# Patient Record
Sex: MALE | Race: WHITE | NOT HISPANIC OR LATINO | Employment: FULL TIME | URBAN - METROPOLITAN AREA
[De-identification: names, ages, dates, MRNs, and addresses within clinical notes are randomized per-mention and may not be internally consistent; named-entity substitution may affect disease eponyms.]

---

## 2017-11-13 ENCOUNTER — OFFICE VISIT (OUTPATIENT)
Dept: URGENT CARE | Facility: MEDICAL CENTER | Age: 32
End: 2017-11-13
Payer: COMMERCIAL

## 2017-11-13 PROCEDURE — G0382 LEV 3 HOSP TYPE B ED VISIT: HCPCS

## 2017-12-30 ENCOUNTER — OFFICE VISIT (OUTPATIENT)
Dept: URGENT CARE | Facility: MEDICAL CENTER | Age: 32
End: 2017-12-30
Payer: COMMERCIAL

## 2017-12-30 PROCEDURE — 99283 EMERGENCY DEPT VISIT LOW MDM: CPT

## 2017-12-30 PROCEDURE — 90715 TDAP VACCINE 7 YRS/> IM: CPT

## 2017-12-30 PROCEDURE — G0382 LEV 3 HOSP TYPE B ED VISIT: HCPCS

## 2017-12-30 PROCEDURE — 12002 RPR S/N/AX/GEN/TRNK2.6-7.5CM: CPT

## 2018-01-01 NOTE — PROGRESS NOTES
Assessment   1  Laceration of thigh, left (890 0) (S71 112A)    Plan   Laceration of skin of knee    · Lidocaine HCl - 1 % Injection Solution   · Lidocaine HCl - 2 % Injection Solution  Laceration of thigh, left    · Tdap (Boostrix)    Discussion/Summary   Discussion Summary:    1  Keep skin clean and dry Keep wound covered Watch for S&S of infection (redness, swelling, skin drainage, fever) Suture removal in 7-10 days  Medication Side Effects Reviewed: Possible side effects of new medications were reviewed with the patient/guardian today  Understands and agrees with treatment plan: The treatment plan was reviewed with the patient/guardian  The patient/guardian understands and agrees with the treatment plan      Chief Complaint   1  Skin Wound  Chief Complaint Free Text Note Form: Pt states he was using a  to cut open a box and accidentally cut L thigh above knee approx 1 inch in length  History of Present Illness   HPI: The patient is a 27-year-old male who presents with a laceration to the anterior aspect of his left thigh  He was using a  when it slipped, striking and on the left thigh  Patient is unaware of his last tetanus vaccine  Hospital Based Practices Required Assessment:      Pain Assessment      the patient states they have pain  The pain is located in the L LEG  (on a scale of 0 to 10, the patient rates the pain at 4 )      Abuse And Domestic Violence Screen   Domestic violence screen not done today  Reason DV Screen not done: not alone       Depression And Suicide Screen  Suicide screen not done today  -- Reason suicide screen not done: not alone  Primary Language is  english  Readiness To Learn: Receptive  Barriers To Learning: none        Preferred Learning: verbal      Education Completed: disease/condition-- and-- treatment/procedure      Teaching Method: verbal      Person Taught: patient      Evaluation Of Learning: verbalized/demonstrated understanding      Review of Systems   Focused-Male:      Constitutional: no fever or chills, feels well, no tiredness, no recent weight loss or weight gain  Integumentary: skin lesion-- and-- skin wound, but-- no rashes  Active Problems   1  Acute URI (465 9) (J06 9)    Past Medical History   Active Problems And Past Medical History Reviewed: The active problems and past medical history were reviewed and updated today  Family History   Mother    1  No pertinent family history  Father    2  No pertinent family history  Family History Reviewed: The family history was reviewed and updated today  Social History    · Alcohol ingestion of one to four drinks per week (V69 8) (Z78 9)   · Never a smoker  Social History Reviewed: The social history was reviewed and updated today  Surgical History   1  Denied: History Of Prior Surgery  Surgical History Reviewed: The surgical history was reviewed and updated today  Current Meds    1  No Reported Medications Recorded  Medication List Reviewed: The medication list was reviewed and updated today  Allergies   1  No Known Drug Allergies    Vitals   Signs   Recorded: [id number] 03:23PM   Temperature: 98 7 F  Heart Rate: 103  Respiration: 16  Systolic: 097  Diastolic: 554  Height: 5 ft 11 in  Weight: 226 lb 12 8 oz  BMI Calculated: 31 63  BSA Calculated: 2 22  O2 Saturation: 98  Pain Scale: 4    Physical Exam        Constitutional      General appearance: No acute distress, well appearing and well nourished  Pulmonary      Respiratory effort: No increased work of breathing or signs of respiratory distress  Auscultation of lungs: Clear to auscultation  Cardiovascular      Auscultation of heart: Normal rate and rhythm, normal S1 and S2, without murmurs  Skin      Skin and subcutaneous tissue: Abnormal  -- 5 cm laceration noted on the left thigh  Procedure        Procedure: wound repair   The wound was located on the left thigh,-- and was 5 cm in length  The wound was simple  The wound was linear  the neurovascular exam was normal, but-- there was no sensory deficit  The site was prepped with Betadine, cleansed and irrigated extensively  Lidocaine 4 ml, 2%, without epinephrine was injected  Closure: The cutaneous layer was closed with 5 sutures of 4-0 nylon--   simple interrupted sutures were used for the skin closure  Dressing: a sterile dressing was placed  Patient Status:  the patient tolerated the procedure well   There were no complications      Signatures    Electronically signed by : Puja Baker [facility]; Dec 30 2017  7:33PM EST                       (Author)     Electronically signed by : SHAD Wang Sa ; Dec 31 2017  1:11PM EST                       (Co-author)

## 2018-01-18 NOTE — MISCELLANEOUS
Message  Return to work or school:   Scott Shine is under my professional care  He was seen in my office on 11/13/17   He is able to return to work on  11/14       excuse due to illness        Signatures   Electronically signed by : Lukas Ramos, [facility]; Nov 13 2017 10:29AM EST                       (Author)

## 2018-01-23 VITALS
SYSTOLIC BLOOD PRESSURE: 160 MMHG | TEMPERATURE: 98.7 F | DIASTOLIC BLOOD PRESSURE: 100 MMHG | BODY MASS INDEX: 31.75 KG/M2 | OXYGEN SATURATION: 98 % | HEIGHT: 71 IN | RESPIRATION RATE: 16 BRPM | WEIGHT: 226.8 LBS | HEART RATE: 103 BPM

## 2019-03-10 ENCOUNTER — OFFICE VISIT (OUTPATIENT)
Dept: URGENT CARE | Facility: MEDICAL CENTER | Age: 34
End: 2019-03-10
Payer: COMMERCIAL

## 2019-03-10 VITALS
WEIGHT: 232.2 LBS | DIASTOLIC BLOOD PRESSURE: 79 MMHG | OXYGEN SATURATION: 97 % | BODY MASS INDEX: 33.24 KG/M2 | SYSTOLIC BLOOD PRESSURE: 135 MMHG | RESPIRATION RATE: 18 BRPM | HEIGHT: 70 IN | HEART RATE: 100 BPM | TEMPERATURE: 99.5 F

## 2019-03-10 DIAGNOSIS — J02.9 SORE THROAT: ICD-10-CM

## 2019-03-10 DIAGNOSIS — J06.9 ACUTE URI: Primary | ICD-10-CM

## 2019-03-10 LAB — S PYO AG THROAT QL: NEGATIVE

## 2019-03-10 PROCEDURE — G0382 LEV 3 HOSP TYPE B ED VISIT: HCPCS | Performed by: PHYSICIAN ASSISTANT

## 2019-03-10 NOTE — PATIENT INSTRUCTIONS
Upper respiratory infection  bromfed 4 times a day as needed  proventil 2 puffs every 6 hours as needed  Follow up with PCP in 3-5 days  Proceed to  ER if symptoms worsen  Upper Respiratory Infection   WHAT YOU NEED TO KNOW:   An upper respiratory infection is also called the common cold  It is an infection that can affect your nose, throat, ears, and sinuses  For healthy people, the common cold is usually not serious and does not need special treatment  Cold symptoms are usually worst for the first 3 to 5 days  Most people get better in 7 to 14 days  You may continue to cough for 2 to 3 weeks  Colds are caused by viruses and do not get better with antibiotics  DISCHARGE INSTRUCTIONS:   Return to the emergency department if:   · You have chest pain or trouble breathing  Contact your healthcare provider if:   · You have a fever over 102ºF (39°C)  · Your sore throat gets worse or you see white or yellow spots in your throat  · Your symptoms get worse after 3 to 5 days or your cold is not better in 14 days  · You have a rash anywhere on your skin  · You have large, tender lumps in your neck  · You have thick, green or yellow drainage from your nose  · You cough up thick yellow, green, or bloody mucus  · You have vomiting for more than 24 hours and cannot keep fluids down  · You have a bad earache  · You have questions or concerns about your condition or care  Medicines: You may need any of the following:  · Decongestants  help reduce nasal congestion and help you breathe more easily  If you take decongestant pills, they may make you feel restless or cause problems with your sleep  Do not use decongestant sprays for more than a few days  · Cough suppressants  help reduce coughing  Ask your healthcare provider which type of cough medicine is best for you  · NSAIDs , such as ibuprofen, help decrease swelling, pain, and fever   NSAIDs can cause stomach bleeding or kidney problems in certain people  If you take blood thinner medicine, always ask your healthcare provider if NSAIDs are safe for you  Always read the medicine label and follow directions  · Acetaminophen  decreases pain and fever  It is available without a doctor's order  Ask how much to take and how often to take it  Follow directions  Read the labels of all other medicines you are using to see if they also contain acetaminophen, or ask your doctor or pharmacist  Acetaminophen can cause liver damage if not taken correctly  Do not use more than 4 grams (4,000 milligrams) total of acetaminophen in one day  · Take your medicine as directed  Contact your healthcare provider if you think your medicine is not helping or if you have side effects  Tell him or her if you are allergic to any medicine  Keep a list of the medicines, vitamins, and herbs you take  Include the amounts, and when and why you take them  Bring the list or the pill bottles to follow-up visits  Carry your medicine list with you in case of an emergency  Follow up with your healthcare provider as directed:  Write down your questions so you remember to ask them during your visits  Self-care:   · Rest as much as possible  Slowly start to do more each day  · Drink more liquids as directed  Liquids will help thin and loosen mucus so you can cough it up  Liquids will also help prevent dehydration  Liquids that help prevent dehydration include water, fruit juice, and broth  Do not drink liquids that contain caffeine  Caffeine can increase your risk for dehydration  Ask your healthcare provider how much liquid to drink each day  · Soothe a sore throat  Gargle with warm salt water  This helps your sore throat feel better  Make salt water by dissolving ¼ teaspoon salt in 1 cup warm water  You may also suck on hard candy or throat lozenges  You may use a sore throat spray  · Use a humidifier or vaporizer    Use a cool mist humidifier or a vaporizer to increase air moisture in your home  This may make it easier for you to breathe and help decrease your cough  · Use saline nasal drops as directed  These help relieve congestion  · Apply petroleum-based jelly around the outside of your nostrils  This can decrease irritation from blowing your nose  · Do not smoke  Nicotine and other chemicals in cigarettes and cigars can make your symptoms worse  They can also cause infections such as bronchitis or pneumonia  Ask your healthcare provider for information if you currently smoke and need help to quit  E-cigarettes or smokeless tobacco still contain nicotine  Talk to your healthcare provider before you use these products  Prevent spreading your cold to others:   · Try to stay away from other people during the first 2 to 3 days of your cold when it is more easily spread  · Do not share food or drinks  · Do not share hand towels with household members  · Wash your hands often, especially after you blow your nose  Turn away from other people and cover your mouth and nose with a tissue when you sneeze or cough  © 2017 [facility] Information is for End User's use only and may not be sold, redistributed or otherwise used for commercial purposes  All illustrations and images included in CareNotes® are the copyrighted property of [employer] M [employer]  or [employer]  The above information is an  only  It is not intended as medical advice for individual conditions or treatments  Talk to your doctor, nurse or pharmacist before following any medical regimen to see if it is safe and effective for you

## 2019-03-10 NOTE — PROGRESS NOTES
[facility]        NAME: John Fernández is a 35 y o  male  : 1985    MRN: [id number]  DATE: March 10, 2019  TIME: 12:18 PM    Assessment and Plan   Acute URI [J06 9]  1  Acute URI     2  Sore throat  POCT rapid strepA         Patient Instructions     Upper respiratory infection  bromfed 4 times a day as needed  proventil 2 puffs every 6 hours as needed  Follow up with PCP in 3-5 days  Proceed to  ER if symptoms worsen  Chief Complaint     Chief Complaint   Patient presents with    Sore Throat     x 4 days          History of Present Illness       36 y/o male presents c/o cough productive of yellow sputum, fever and sore throat x 4 days  Denies chest pain, SOB      Review of Systems   Review of Systems   Constitutional: Positive for fever  HENT: Positive for sore throat  Negative for congestion, dental problem, drooling, ear pain, postnasal drip, rhinorrhea, sinus pain, trouble swallowing and voice change  Eyes: Negative  Respiratory: Positive for cough  Negative for apnea, choking, chest tightness, shortness of breath, wheezing and stridor  Cardiovascular: Negative  Negative for chest pain  Current Medications     No current outpatient medications on file  Current Allergies     Allergies as of 03/10/2019    (No Known Allergies)            The following portions of the patient's history were reviewed and updated as appropriate: allergies, current medications, past family history, past medical history, past social history, past surgical history and problem list      History reviewed  No pertinent past medical history  History reviewed  No pertinent surgical history  Family History   Problem Relation Age of Onset    No Known Problems Mother     No Known Problems Father          Medications have been verified          Objective   /79 (BP Location: Right arm, Patient Position: Sitting, Cuff Size: Standard)   Pulse 100   Temp 99 5 °F (37 5 °C) (Temporal) Resp 18   Ht 5' 10" (1 778 m)   Wt 105 kg (232 lb 3 2 oz)   SpO2 97%   BMI 33 32 kg/m²        Physical Exam     Physical Exam   Constitutional: He appears well-developed and well-nourished  No distress  HENT:   Head: Normocephalic and atraumatic  Right Ear: Hearing, tympanic membrane, external ear and ear canal normal    Left Ear: Hearing, tympanic membrane, external ear and ear canal normal    Nose: Rhinorrhea present  Right sinus exhibits no maxillary sinus tenderness and no frontal sinus tenderness  Left sinus exhibits no maxillary sinus tenderness and no frontal sinus tenderness  Mouth/Throat: Uvula is midline and mucous membranes are normal  Posterior oropharyngeal erythema present  No oropharyngeal exudate or posterior oropharyngeal edema  Neck: Normal range of motion  Neck supple  Cardiovascular: Normal rate, regular rhythm, normal heart sounds and intact distal pulses  Pulmonary/Chest: Effort normal and breath sounds normal  No respiratory distress  He has no wheezes  He has no rales  He exhibits no tenderness  Lymphadenopathy:     He has no cervical adenopathy  Skin: He is not diaphoretic

## 2019-11-18 ENCOUNTER — OFFICE VISIT (OUTPATIENT)
Dept: URGENT CARE | Facility: MEDICAL CENTER | Age: 34
End: 2019-11-18
Payer: COMMERCIAL

## 2019-11-18 VITALS
SYSTOLIC BLOOD PRESSURE: 128 MMHG | HEART RATE: 83 BPM | HEIGHT: 70 IN | WEIGHT: 228 LBS | RESPIRATION RATE: 18 BRPM | BODY MASS INDEX: 32.64 KG/M2 | OXYGEN SATURATION: 97 % | TEMPERATURE: 97.4 F | DIASTOLIC BLOOD PRESSURE: 82 MMHG

## 2019-11-18 DIAGNOSIS — R30.0 DYSURIA: Primary | ICD-10-CM

## 2019-11-18 PROCEDURE — G0382 LEV 3 HOSP TYPE B ED VISIT: HCPCS | Performed by: PHYSICIAN ASSISTANT

## 2019-11-18 PROCEDURE — 87591 N.GONORRHOEAE DNA AMP PROB: CPT | Performed by: PHYSICIAN ASSISTANT

## 2019-11-18 PROCEDURE — 87491 CHLMYD TRACH DNA AMP PROBE: CPT | Performed by: PHYSICIAN ASSISTANT

## 2019-11-18 NOTE — PROGRESS NOTES
[facility]        NAME: Simon Us is a 35 y o  male  : 1985    MRN: [id number]  DATE: 2019  TIME: 6:26 PM    Assessment and Plan   Dysuria [R30 0]  1  Dysuria  Chlamydia/GC amplified DNA by PCR       Urine dip in office is completely negative  Will send for [facility] and call if this is positive  Recommended follow-up with PCP if this is negative and symptoms persist     Patient Instructions       May take Tylenol or Motrin for pain   follow-up with PCP if symptoms do not improve   will call if urine is positive on send out    Chief Complaint     Chief Complaint   Patient presents with    Possible UTI     Dysuria x 2 days  History of Present Illness        Patient is a 66-year-old male who presents today with complaints of dysuria since yesterday  He denies urgency or frequency  He has not had any testicle pain, penile discharge, genital sores  Patient is sexually active, no new partners  He states he is not concerned for STD  No hematuria, back pain, abdominal pain  Review of Systems   Review of Systems   Constitutional: Negative for fever  Respiratory: Negative for shortness of breath  Cardiovascular: Negative for chest pain  Gastrointestinal: Negative for abdominal pain  Genitourinary: Positive for dysuria  Negative for discharge, frequency, genital sores, penile pain, testicular pain and urgency  Musculoskeletal: Negative for back pain  Current Medications     No current outpatient medications on file  Current Allergies     Allergies as of 2019    (No Known Allergies)            The following portions of the patient's history were reviewed and updated as appropriate: allergies, current medications, past family history, past medical history, past social history, past surgical history and problem list      History reviewed  No pertinent past medical history  History reviewed  No pertinent surgical history      Family History Problem Relation Age of Onset    No Known Problems Mother     No Known Problems Father          Medications have been verified  Objective   /82   Pulse 83   Temp (!) 97 4 °F (36 3 °C) (Temporal)   Resp 18   Ht 5' 10" (1 778 m)   Wt 103 kg (228 lb)   SpO2 97%   BMI 32 71 kg/m²        Physical Exam     Physical Exam   Constitutional: He appears well-developed and well-nourished  HENT:   Head: Normocephalic and atraumatic  Cardiovascular: Normal rate and regular rhythm  Pulmonary/Chest: Effort normal and breath sounds normal    Abdominal: Soft  Bowel sounds are normal  He exhibits no distension  There is no tenderness  Skin: Skin is warm and dry

## 2019-11-18 NOTE — PATIENT INSTRUCTIONS
May take Tylenol or Motrin for pain   follow-up with PCP if symptoms do not improve   will call if urine is positive on send out    Dysuria   WHAT YOU NEED TO KNOW:   Dysuria is difficulty urinating, or pain, burning, or discomfort with urination  Dysuria is usually a symptom of another problem  DISCHARGE INSTRUCTIONS:   Return to the emergency department if:   · You have severe back, side, or abdominal pain  · You have fever and shaking chills  · You vomit several times in a row  Contact your healthcare provider if:   · Your symptoms do not go away, even after treatment  · You have questions or concerns about your condition or care  Medicines:   · Medicines  may be given to help treat a bacterial infection or help decrease bladder spasms  · Take your medicine as directed  Contact your healthcare provider if you think your medicine is not helping or if you have side effects  Tell him of her if you are allergic to any medicine  Keep a list of the medicines, vitamins, and herbs you take  Include the amounts, and when and why you take them  Bring the list or the pill bottles to follow-up visits  Carry your medicine list with you in case of an emergency  Follow up with your healthcare provider as directed: Your healthcare provider may also refer you to a urologist or nephrologist to have additional testing  Write down your questions so you remember to ask them during your visits  Manage your dysuria:   · Drink more liquids  Liquids help flush out bacteria that may be causing an infection  Ask your healthcare provider how much liquid to drink each day and which liquids are best for you  · Take sitz baths as directed  Fill a bathtub with 4 to 6 inches of warm water  You may also use a sitz bath pan that fits over a toilet  Sit in the sitz bath for 20 minutes  Do this 2 to 3 times a day, or as directed  The warm water can help decrease pain and swelling     © 2017 [facility] Information is for End User's use only and may not be sold, redistributed or otherwise used for commercial purposes  All illustrations and images included in CareNotes® are the copyrighted property of [employer] M , Inc  or [employer]  The above information is an  only  It is not intended as medical advice for individual conditions or treatments  Talk to your doctor, nurse or pharmacist before following any medical regimen to see if it is safe and effective for you

## 2019-11-19 LAB
C TRACH DNA SPEC QL NAA+PROBE: NEGATIVE
N GONORRHOEA DNA SPEC QL NAA+PROBE: NEGATIVE

## 2020-07-17 ENCOUNTER — OFFICE VISIT (OUTPATIENT)
Dept: FAMILY MEDICINE CLINIC | Facility: CLINIC | Age: 35
End: 2020-07-17
Payer: COMMERCIAL

## 2020-07-17 VITALS
WEIGHT: 221 LBS | TEMPERATURE: 98.2 F | HEART RATE: 76 BPM | SYSTOLIC BLOOD PRESSURE: 124 MMHG | DIASTOLIC BLOOD PRESSURE: 80 MMHG | OXYGEN SATURATION: 98 % | BODY MASS INDEX: 31.64 KG/M2 | HEIGHT: 70 IN | RESPIRATION RATE: 18 BRPM

## 2020-07-17 DIAGNOSIS — M54.41 ACUTE BILATERAL LOW BACK PAIN WITH RIGHT-SIDED SCIATICA: ICD-10-CM

## 2020-07-17 DIAGNOSIS — D22.9 NEVUS: ICD-10-CM

## 2020-07-17 DIAGNOSIS — Z00.00 PHYSICAL EXAM: Primary | ICD-10-CM

## 2020-07-17 LAB
SL AMB  POCT GLUCOSE, UA: NORMAL
SL AMB LEUKOCYTE ESTERASE,UA: NORMAL
SL AMB POCT BILIRUBIN,UA: NORMAL
SL AMB POCT BLOOD,UA: NORMAL
SL AMB POCT CLARITY,UA: CLEAR
SL AMB POCT COLOR,UA: YELLOW
SL AMB POCT KETONES,UA: NORMAL
SL AMB POCT NITRITE,UA: NORMAL
SL AMB POCT PH,UA: 8
SL AMB POCT SPECIFIC GRAVITY,UA: 1
SL AMB POCT URINE PROTEIN: NORMAL
SL AMB POCT UROBILINOGEN: 1

## 2020-07-17 PROCEDURE — 3008F BODY MASS INDEX DOCD: CPT | Performed by: FAMILY MEDICINE

## 2020-07-17 PROCEDURE — 99385 PREV VISIT NEW AGE 18-39: CPT | Performed by: FAMILY MEDICINE

## 2020-07-17 PROCEDURE — 81002 URINALYSIS NONAUTO W/O SCOPE: CPT | Performed by: FAMILY MEDICINE

## 2020-07-17 RX ORDER — CYCLOBENZAPRINE HCL 5 MG
5 TABLET ORAL 3 TIMES DAILY PRN
Qty: 21 TABLET | Refills: 0 | Status: SHIPPED | OUTPATIENT
Start: 2020-07-17

## 2020-07-17 RX ORDER — METHYLPREDNISOLONE 4 MG/1
TABLET ORAL
Qty: 21 EACH | Refills: 0 | Status: SHIPPED | OUTPATIENT
Start: 2020-07-17

## 2020-07-17 NOTE — PROGRESS NOTES
Assessment/Plan:  Physical exam unremarkable  Routine labs given, will fu when results are available  Some lumbago no gate change  Offered conservative measures  Dosing and all possible se of the rx medications were discussed and all questions were answered  Pt verbalized agreement and understanding of the plan of care as outlined during his visit  No problem-specific Assessment & Plan notes found for this encounter  Problem List Items Addressed This Visit     None      Visit Diagnoses     Physical exam    -  Primary    Relevant Orders    POCT urine dip (Completed)    CBC and differential    Comprehensive metabolic panel    Lipid panel    TSH, 3rd generation    UA w Reflex to Microscopic w Reflex to Culture    Ambulatory referral to Dermatology    Nevus        Acute bilateral low back pain with right-sided sciatica        Relevant Medications    methylPREDNISolone 4 MG tablet therapy pack    cyclobenzaprine (FLEXERIL) 5 mg tablet            Subjective:      Patient ID: Angelo Blair is a 29 y o  male  Pt here for a health physical  Some lumbago otherwise healthly  Dose not recall last labs if ever  The following portions of the patient's history were reviewed and updated as appropriate: current medications, past family history, past medical history, past social history and past surgical history  Review of Systems   Constitutional: Negative for appetite change and fever  HENT: Negative for congestion and trouble swallowing  Respiratory: Negative for shortness of breath  Cardiovascular: Negative for chest pain  Gastrointestinal: Negative for abdominal pain  Genitourinary: Negative for difficulty urinating  Musculoskeletal: Negative for myalgias  Neurological: Negative for dizziness  Psychiatric/Behavioral: The patient is not nervous/anxious  Objective:  BMI Counseling: Body mass index is 32 17 kg/m²   The BMI is above normal  Nutrition recommendations include decreasing portion sizes, encouraging healthy choices of fruits and vegetables, decreasing fast food intake, consuming healthier snacks, limiting drinks that contain sugar, moderation in carbohydrate intake, increasing intake of lean protein, reducing intake of saturated and trans fat and reducing intake of cholesterol  Exercise recommendations include exercising 3-5 times per week, obtaining a gym membership and strength training exercises  No pharmacotherapy was ordered  Patient referred to PCP due to patient being overweight  BMI Counseling: Body mass index is 32 17 kg/m²  Follow-up plan was not completed due to patient refusing BMI follow-up plan  /80 (BP Location: Left arm, Patient Position: Sitting, Cuff Size: Large)   Pulse 76   Temp 98 2 °F (36 8 °C) (Temporal)   Resp 18   Ht 5' 9 5" (1 765 m)   Wt 100 kg (221 lb)   SpO2 98%   BMI 32 17 kg/m²          Physical Exam   Constitutional: He is oriented to person, place, and time  He appears well-developed and well-nourished  No distress  HENT:   Head: Normocephalic  Right Ear: External ear normal    Left Ear: External ear normal    Mouth/Throat: Oropharynx is clear and moist    Eyes: Pupils are equal, round, and reactive to light  EOM are normal    Neck: Normal range of motion  Cardiovascular: Normal rate and regular rhythm  Pulmonary/Chest: Effort normal and breath sounds normal    Abdominal: Soft  Musculoskeletal: Normal range of motion  Neurological: He is alert and oriented to person, place, and time  Skin: Skin is warm and dry  Psychiatric: He has a normal mood and affect  His behavior is normal  Judgment and thought content normal    Nursing note and vitals reviewed

## 2020-07-24 ENCOUNTER — APPOINTMENT (OUTPATIENT)
Dept: LAB | Facility: MEDICAL CENTER | Age: 35
End: 2020-07-24
Payer: COMMERCIAL

## 2020-07-24 DIAGNOSIS — Z00.00 PHYSICAL EXAM: ICD-10-CM

## 2020-07-24 LAB
ALBUMIN SERPL BCP-MCNC: 4.2 G/DL (ref 3.5–5)
ALP SERPL-CCNC: 97 U/L (ref 46–116)
ALT SERPL W P-5'-P-CCNC: 29 U/L (ref 12–78)
ANION GAP SERPL CALCULATED.3IONS-SCNC: 5 MMOL/L (ref 4–13)
AST SERPL W P-5'-P-CCNC: 11 U/L (ref 5–45)
BASOPHILS # BLD AUTO: 0.05 THOUSANDS/ΜL (ref 0–0.1)
BASOPHILS NFR BLD AUTO: 1 % (ref 0–1)
BILIRUB SERPL-MCNC: 0.8 MG/DL (ref 0.2–1)
BILIRUB UR QL STRIP: NEGATIVE
BUN SERPL-MCNC: 18 MG/DL (ref 5–25)
CALCIUM SERPL-MCNC: 8.9 MG/DL (ref 8.3–10.1)
CHLORIDE SERPL-SCNC: 105 MMOL/L (ref 100–108)
CHOLEST SERPL-MCNC: 181 MG/DL (ref 50–200)
CLARITY UR: CLEAR
CO2 SERPL-SCNC: 30 MMOL/L (ref 21–32)
COLOR UR: YELLOW
CREAT SERPL-MCNC: 0.9 MG/DL (ref 0.6–1.3)
EOSINOPHIL # BLD AUTO: 0.05 THOUSAND/ΜL (ref 0–0.61)
EOSINOPHIL NFR BLD AUTO: 1 % (ref 0–6)
ERYTHROCYTE [DISTWIDTH] IN BLOOD BY AUTOMATED COUNT: 12.6 % (ref 11.6–15.1)
GFR SERPL CREATININE-BSD FRML MDRD: 111 ML/MIN/1.73SQ M
GLUCOSE P FAST SERPL-MCNC: 79 MG/DL (ref 65–99)
GLUCOSE UR STRIP-MCNC: NEGATIVE MG/DL
HCT VFR BLD AUTO: 47.7 % (ref 36.5–49.3)
HDLC SERPL-MCNC: 36 MG/DL
HGB BLD-MCNC: 16.6 G/DL (ref 12–17)
HGB UR QL STRIP.AUTO: NEGATIVE
IMM GRANULOCYTES # BLD AUTO: 0.04 THOUSAND/UL (ref 0–0.2)
IMM GRANULOCYTES NFR BLD AUTO: 1 % (ref 0–2)
KETONES UR STRIP-MCNC: NEGATIVE MG/DL
LDLC SERPL CALC-MCNC: 105 MG/DL (ref 0–100)
LEUKOCYTE ESTERASE UR QL STRIP: NEGATIVE
LYMPHOCYTES # BLD AUTO: 2.05 THOUSANDS/ΜL (ref 0.6–4.47)
LYMPHOCYTES NFR BLD AUTO: 34 % (ref 14–44)
MCH RBC QN AUTO: 29.4 PG (ref 26.8–34.3)
MCHC RBC AUTO-ENTMCNC: 34.8 G/DL (ref 31.4–37.4)
MCV RBC AUTO: 84 FL (ref 82–98)
MONOCYTES # BLD AUTO: 0.54 THOUSAND/ΜL (ref 0.17–1.22)
MONOCYTES NFR BLD AUTO: 9 % (ref 4–12)
NEUTROPHILS # BLD AUTO: 3.33 THOUSANDS/ΜL (ref 1.85–7.62)
NEUTS SEG NFR BLD AUTO: 54 % (ref 43–75)
NITRITE UR QL STRIP: NEGATIVE
NONHDLC SERPL-MCNC: 145 MG/DL
NRBC BLD AUTO-RTO: 0 /100 WBCS
PH UR STRIP.AUTO: 5.5 [PH]
PLATELET # BLD AUTO: 221 THOUSANDS/UL (ref 149–390)
PMV BLD AUTO: 11.8 FL (ref 8.9–12.7)
POTASSIUM SERPL-SCNC: 4.3 MMOL/L (ref 3.5–5.3)
PROT SERPL-MCNC: 8.1 G/DL (ref 6.4–8.2)
PROT UR STRIP-MCNC: NEGATIVE MG/DL
RBC # BLD AUTO: 5.65 MILLION/UL (ref 3.88–5.62)
SODIUM SERPL-SCNC: 140 MMOL/L (ref 136–145)
SP GR UR STRIP.AUTO: 1.03 (ref 1–1.03)
TRIGL SERPL-MCNC: 198 MG/DL
TSH SERPL DL<=0.05 MIU/L-ACNC: 2.26 UIU/ML (ref 0.36–3.74)
UROBILINOGEN UR QL STRIP.AUTO: 0.2 E.U./DL
WBC # BLD AUTO: 6.06 THOUSAND/UL (ref 4.31–10.16)

## 2020-07-24 PROCEDURE — 80053 COMPREHEN METABOLIC PANEL: CPT

## 2020-07-24 PROCEDURE — 85025 COMPLETE CBC W/AUTO DIFF WBC: CPT

## 2020-07-24 PROCEDURE — 36415 COLL VENOUS BLD VENIPUNCTURE: CPT

## 2020-07-24 PROCEDURE — 84443 ASSAY THYROID STIM HORMONE: CPT

## 2020-07-24 PROCEDURE — 81003 URINALYSIS AUTO W/O SCOPE: CPT | Performed by: NURSE PRACTITIONER

## 2020-07-24 PROCEDURE — 80061 LIPID PANEL: CPT

## 2020-09-29 ENCOUNTER — TELEPHONE (OUTPATIENT)
Dept: FAMILY MEDICINE CLINIC | Facility: CLINIC | Age: 35
End: 2020-09-29

## 2020-09-29 ENCOUNTER — TELEMEDICINE (OUTPATIENT)
Dept: FAMILY MEDICINE CLINIC | Facility: CLINIC | Age: 35
End: 2020-09-29
Payer: COMMERCIAL

## 2020-09-29 DIAGNOSIS — R05.9 COUGH: Primary | ICD-10-CM

## 2020-09-29 PROCEDURE — 99441 PR PHYS/QHP TELEPHONE EVALUATION 5-10 MIN: CPT | Performed by: NURSE PRACTITIONER

## 2020-09-29 NOTE — PROGRESS NOTES
Virtual Brief Visit    Assessment/Plan:    Problem List Items Addressed This Visit     None                Reason for visit is No chief complaint on file  Encounter provider ANNAMARIE Castañeda    Provider located at [address] [place], [place] [id number] [phone]    Recent Visits  No visits were found meeting these conditions  Showing recent visits within past 7 days and meeting all other requirements     Future Appointments  No visits were found meeting these conditions  Showing future appointments within next 150 days and meeting all other requirements        After connecting through telephone, the patient was identified by name and date of birth  Sarah Mustafa was informed that this is a telemedicine visit and that the visit is being conducted through telephone  My office door was closed  No one else was in the room  He acknowledged consent and understanding of privacy and security of the platform  The patient has agreed to participate and understands he can discontinue the visit at any time  Patient is aware this is a billable service  Subjective    Sarah Mustafa is a 29 y o  male     Pt has body aches sore thraot and cough  He needs to be tested for COIVD to return to work  No past medical history on file  Past Surgical History:   Procedure Laterality Date    TONSILLECTOMY         Current Outpatient Medications   Medication Sig Dispense Refill    cyclobenzaprine (FLEXERIL) 5 mg tablet Take 1 tablet (5 mg total) by mouth 3 (three) times a day as needed for muscle spasms 21 tablet 0    methylPREDNISolone 4 MG tablet therapy pack Use as directed on package 21 each 0     No current facility-administered medications for this visit  No Known Allergies    Review of Systems   Constitutional: Positive for fatigue  HENT: Positive for congestion and postnasal drip  Respiratory: Positive for cough and wheezing  There were no vitals filed for this visit  I spent 10 minutes directly with the patient during this visit    VIRTUAL VISIT DISCLAIMER    Woody Weeks acknowledges that he has consented to an online visit or consultation  He understands that the online visit is based solely on information provided by him, and that, in the absence of a face-to-face physical evaluation by the physician, the diagnosis he receives is both limited and provisional in terms of accuracy and completeness  This is not intended to replace a full medical face-to-face evaluation by the physician  Woody Weeks understands and accepts these terms

## 2020-09-29 NOTE — TELEPHONE ENCOUNTER
Pt called and stated that he had a virtual visit with you today and needs a work note stating that he is under our care and getting tested before cleared to go back to work  He would like to have it faxed to himself at [phone]

## 2020-09-30 DIAGNOSIS — R05.9 COUGH: ICD-10-CM

## 2020-09-30 PROCEDURE — U0003 INFECTIOUS AGENT DETECTION BY NUCLEIC ACID (DNA OR RNA); SEVERE ACUTE RESPIRATORY SYNDROME CORONAVIRUS 2 (SARS-COV-2) (CORONAVIRUS DISEASE [COVID-19]), AMPLIFIED PROBE TECHNIQUE, MAKING USE OF HIGH THROUGHPUT TECHNOLOGIES AS DESCRIBED BY CMS-2020-01-R: HCPCS | Performed by: NURSE PRACTITIONER

## 2020-10-01 LAB — SARS-COV-2 RNA SPEC QL NAA+PROBE: NOT DETECTED

## 2020-11-28 ENCOUNTER — HOSPITAL ENCOUNTER (EMERGENCY)
Facility: HOSPITAL | Age: 35
Discharge: HOME | End: 2020-11-28
Attending: EMERGENCY MEDICINE | Admitting: EMERGENCY MEDICINE
Payer: COMMERCIAL

## 2020-11-28 ENCOUNTER — AMB VIDEO VISIT (OUTPATIENT)
Dept: OTHER | Facility: HOSPITAL | Age: 35
End: 2020-11-28

## 2020-11-28 VITALS
TEMPERATURE: 99.2 F | HEART RATE: 99 BPM | RESPIRATION RATE: 18 BRPM | OXYGEN SATURATION: 97 % | DIASTOLIC BLOOD PRESSURE: 99 MMHG | WEIGHT: 217.15 LBS | BODY MASS INDEX: 31.61 KG/M2 | SYSTOLIC BLOOD PRESSURE: 146 MMHG

## 2020-11-28 DIAGNOSIS — J40 BRONCHITIS: Primary | ICD-10-CM

## 2020-11-28 PROCEDURE — 99283 EMERGENCY DEPT VISIT LOW MDM: CPT

## 2020-11-28 PROCEDURE — EVISIT: Performed by: FAMILY MEDICINE

## 2020-11-28 PROCEDURE — U0003 INFECTIOUS AGENT DETECTION BY NUCLEIC ACID (DNA OR RNA); SEVERE ACUTE RESPIRATORY SYNDROME CORONAVIRUS 2 (SARS-COV-2) (CORONAVIRUS DISEASE [COVID-19]), AMPLIFIED PROBE TECHNIQUE, MAKING USE OF HIGH THROUGHPUT TECHNOLOGIES AS DESCRIBED BY CMS-2020-01-R: HCPCS | Performed by: EMERGENCY MEDICINE

## 2020-11-28 PROCEDURE — 99284 EMERGENCY DEPT VISIT MOD MDM: CPT | Performed by: EMERGENCY MEDICINE

## 2020-11-28 RX ORDER — AZITHROMYCIN 250 MG/1
250 TABLET, FILM COATED ORAL DAILY
Qty: 4 TABLET | Refills: 0 | Status: SHIPPED | OUTPATIENT
Start: 2020-11-28 | End: 2020-12-02

## 2020-11-28 RX ORDER — AZITHROMYCIN 500 MG/1
500 TABLET, FILM COATED ORAL ONCE
Status: COMPLETED | OUTPATIENT
Start: 2020-11-28 | End: 2020-11-28

## 2020-11-28 RX ADMIN — AZITHROMYCIN 500 MG: 500 TABLET, FILM COATED ORAL at 17:08

## 2020-11-30 LAB — SARS-COV-2 RNA SPEC QL NAA+PROBE: NOT DETECTED

## 2021-04-12 DIAGNOSIS — Z23 ENCOUNTER FOR IMMUNIZATION: ICD-10-CM

## 2021-04-28 ENCOUNTER — IMMUNIZATIONS (OUTPATIENT)
Dept: FAMILY MEDICINE CLINIC | Facility: HOSPITAL | Age: 36
End: 2021-04-28

## 2021-04-28 DIAGNOSIS — Z23 ENCOUNTER FOR IMMUNIZATION: Primary | ICD-10-CM

## 2021-04-28 PROCEDURE — 91300 SARS-COV-2 / COVID-19 MRNA VACCINE (PFIZER-BIONTECH) 30 MCG: CPT

## 2021-04-28 PROCEDURE — 0001A SARS-COV-2 / COVID-19 MRNA VACCINE (PFIZER-BIONTECH) 30 MCG: CPT

## 2021-05-19 ENCOUNTER — IMMUNIZATIONS (OUTPATIENT)
Dept: FAMILY MEDICINE CLINIC | Facility: HOSPITAL | Age: 36
End: 2021-05-19

## 2021-05-19 DIAGNOSIS — Z23 ENCOUNTER FOR IMMUNIZATION: Primary | ICD-10-CM

## 2021-05-19 PROCEDURE — 0002A SARS-COV-2 / COVID-19 MRNA VACCINE (PFIZER-BIONTECH) 30 MCG: CPT

## 2021-05-19 PROCEDURE — 91300 SARS-COV-2 / COVID-19 MRNA VACCINE (PFIZER-BIONTECH) 30 MCG: CPT

## 2021-11-21 ENCOUNTER — HOSPITAL ENCOUNTER (EMERGENCY)
Facility: HOSPITAL | Age: 36
Discharge: HOME | End: 2021-11-21
Attending: EMERGENCY MEDICINE
Payer: OTHER MISCELLANEOUS

## 2021-11-21 VITALS
WEIGHT: 220 LBS | HEIGHT: 71 IN | TEMPERATURE: 98 F | BODY MASS INDEX: 30.8 KG/M2 | OXYGEN SATURATION: 97 % | RESPIRATION RATE: 18 BRPM | DIASTOLIC BLOOD PRESSURE: 92 MMHG | SYSTOLIC BLOOD PRESSURE: 150 MMHG | HEART RATE: 83 BPM

## 2021-11-21 DIAGNOSIS — H60.532 ACUTE CONTACT OTITIS EXTERNA OF LEFT EAR: Primary | ICD-10-CM

## 2021-11-21 DIAGNOSIS — H72.92 PERFORATED TYMPANIC MEMBRANE ON EXAMINATION, LEFT: ICD-10-CM

## 2021-11-21 PROCEDURE — 99282 EMERGENCY DEPT VISIT SF MDM: CPT

## 2021-11-21 PROCEDURE — 99284 EMERGENCY DEPT VISIT MOD MDM: CPT | Performed by: EMERGENCY MEDICINE

## 2021-11-21 RX ORDER — OFLOXACIN 3 MG/ML
10 SOLUTION AURICULAR (OTIC) 2 TIMES DAILY
Qty: 10 ML | Refills: 0 | Status: SHIPPED | OUTPATIENT
Start: 2021-11-21

## 2021-12-15 ENCOUNTER — TELEMEDICINE (OUTPATIENT)
Dept: FAMILY MEDICINE CLINIC | Facility: CLINIC | Age: 36
End: 2021-12-15
Payer: COMMERCIAL

## 2021-12-15 DIAGNOSIS — R53.83 FATIGUE, UNSPECIFIED TYPE: Primary | ICD-10-CM

## 2021-12-15 PROCEDURE — G2012 BRIEF CHECK IN BY MD/QHP: HCPCS | Performed by: FAMILY MEDICINE

## 2021-12-15 PROCEDURE — 87636 SARSCOV2 & INF A&B AMP PRB: CPT | Performed by: FAMILY MEDICINE

## 2021-12-17 LAB
FLUAV RNA RESP QL NAA+PROBE: NEGATIVE
FLUBV RNA RESP QL NAA+PROBE: NEGATIVE
SARS-COV-2 RNA RESP QL NAA+PROBE: NEGATIVE

## 2022-06-09 ENCOUNTER — OFFICE VISIT (OUTPATIENT)
Dept: URGENT CARE | Facility: CLINIC | Age: 37
End: 2022-06-09
Payer: COMMERCIAL

## 2022-06-09 VITALS
SYSTOLIC BLOOD PRESSURE: 150 MMHG | TEMPERATURE: 97.8 F | OXYGEN SATURATION: 97 % | DIASTOLIC BLOOD PRESSURE: 84 MMHG | HEART RATE: 88 BPM

## 2022-06-09 DIAGNOSIS — B34.9 VIRAL INFECTION: Primary | ICD-10-CM

## 2022-06-09 LAB
SARS-COV-2 AG UPPER RESP QL IA: NEGATIVE
VALID CONTROL: NORMAL

## 2022-06-09 PROCEDURE — 87811 SARS-COV-2 COVID19 W/OPTIC: CPT | Performed by: PHYSICIAN ASSISTANT

## 2022-06-09 PROCEDURE — G0382 LEV 3 HOSP TYPE B ED VISIT: HCPCS | Performed by: PHYSICIAN ASSISTANT

## 2022-06-09 NOTE — PROGRESS NOTES
[facility]        NAME: Vita Garcia is a 39 y o  male  : 1985    MRN: [id number]  DATE: 2022  TIME: 3:08 PM    Assessment and Plan   Viral infection [B34 9]  1  Viral infection  Poct Covid 19 Rapid Antigen Test         Patient Instructions   Patient Instructions   Rapid COVID negative        Follow up with PCP in 3-5 days  Proceed to  ER if symptoms worsen  Chief Complaint     Chief Complaint   Patient presents with    Cold Like Symptoms     Tues sinus h/a and runny nose with cough  Feel out of breath  At home covid test Wed  + for chills  Denies ear pain and or sore thorat         History of Present Illness       Patient presents with cough, congestion, runny nose, shortness of breath with coughing fits starting 2 days ago  Took an at home COVID test that was negative  Denies ear pain, sore throat, chest pain, shortness of breath arrest, sick contacts, fevers  Review of Systems   Review of Systems   Constitutional: Positive for chills  Negative for fatigue and fever  HENT: Negative for congestion, ear discharge, ear pain, postnasal drip, rhinorrhea, sinus pressure, sinus pain and sore throat  Respiratory: Positive for cough and shortness of breath  Negative for chest tightness and wheezing  Cardiovascular: Negative for chest pain and palpitations  Musculoskeletal: Negative for arthralgias and myalgias  Neurological: Positive for headaches  Negative for weakness  Psychiatric/Behavioral: Negative for confusion           Current Medications       Current Outpatient Medications:     cyclobenzaprine (FLEXERIL) 5 mg tablet, Take 1 tablet (5 mg total) by mouth 3 (three) times a day as needed for muscle spasms (Patient not taking: Reported on 2021 ), Disp: 21 tablet, Rfl: 0    methylPREDNISolone 4 MG tablet therapy pack, Use as directed on package (Patient not taking: Reported on 2022 ), Disp: 21 each, Rfl: 0    ofloxacin (FLOXIN) 0 3 % otic solution, Administer 10 drops into the left ear 2 (two) times a day (Patient not taking: Reported on 12/21/2021 ), Disp: 10 mL, Rfl: 0    Current Allergies     Allergies as of 06/09/2022    (No Known Allergies)            The following portions of the patient's history were reviewed and updated as appropriate: allergies, current medications, past family history, past medical history, past social history, past surgical history and problem list      Past Medical History:   Diagnosis Date    Ear problems     HL (hearing loss)        Past Surgical History:   Procedure Laterality Date    ADENOIDECTOMY      TONSILLECTOMY         Family History   Problem Relation Age of Onset    No Known Problems Mother     No Known Problems Father          Medications have been verified  Objective   /84   Pulse 88   Temp 97 8 °F (36 6 °C)   SpO2 97%        Physical Exam     Physical Exam  Constitutional:       General: He is not in acute distress  Appearance: Normal appearance  He is not ill-appearing or diaphoretic  HENT:      Right Ear: Tympanic membrane, ear canal and external ear normal       Left Ear: Tympanic membrane, ear canal and external ear normal       Nose: Nose normal       Mouth/Throat:      Mouth: Mucous membranes are moist       Pharynx: Oropharynx is clear  Eyes:      Conjunctiva/sclera: Conjunctivae normal    Cardiovascular:      Rate and Rhythm: Normal rate and regular rhythm  Heart sounds: Normal heart sounds  Pulmonary:      Effort: Pulmonary effort is normal       Breath sounds: Normal breath sounds  Skin:     General: Skin is warm and dry  Neurological:      Mental Status: He is alert     Psychiatric:         Mood and Affect: Mood normal          Behavior: Behavior normal

## 2022-07-28 ENCOUNTER — APPOINTMENT (EMERGENCY)
Dept: CT IMAGING | Facility: HOSPITAL | Age: 37
End: 2022-07-28
Payer: COMMERCIAL

## 2022-07-28 ENCOUNTER — HOSPITAL ENCOUNTER (EMERGENCY)
Facility: HOSPITAL | Age: 37
Discharge: HOME | End: 2022-07-28
Attending: EMERGENCY MEDICINE | Admitting: EMERGENCY MEDICINE
Payer: COMMERCIAL

## 2022-07-28 VITALS
BODY MASS INDEX: 30.8 KG/M2 | RESPIRATION RATE: 16 BRPM | HEIGHT: 71 IN | HEART RATE: 66 BPM | DIASTOLIC BLOOD PRESSURE: 55 MMHG | TEMPERATURE: 98.6 F | OXYGEN SATURATION: 97 % | SYSTOLIC BLOOD PRESSURE: 117 MMHG | WEIGHT: 220 LBS

## 2022-07-28 DIAGNOSIS — R10.9 ABDOMINAL PAIN: Primary | ICD-10-CM

## 2022-07-28 LAB
ALBUMIN SERPL BCP-MCNC: 4.1 G/DL (ref 3.5–5)
ALP SERPL-CCNC: 98 U/L (ref 46–116)
ALT SERPL W P-5'-P-CCNC: 28 U/L (ref 12–78)
ANION GAP SERPL CALCULATED.3IONS-SCNC: 6 MMOL/L (ref 4–13)
AST SERPL W P-5'-P-CCNC: 19 U/L (ref 5–45)
BASOPHILS # BLD AUTO: 0.03 THOUSANDS/ΜL (ref 0–0.1)
BASOPHILS NFR BLD AUTO: 1 % (ref 0–1)
BILIRUB SERPL-MCNC: 0.6 MG/DL (ref 0.2–1)
BUN SERPL-MCNC: 22 MG/DL (ref 5–25)
CALCIUM SERPL-MCNC: 8.8 MG/DL (ref 8.3–10.1)
CHLORIDE SERPL-SCNC: 101 MMOL/L (ref 96–108)
CO2 SERPL-SCNC: 29 MMOL/L (ref 21–32)
CREAT SERPL-MCNC: 1.01 MG/DL (ref 0.6–1.3)
EOSINOPHIL # BLD AUTO: 0.13 THOUSAND/ΜL (ref 0–0.61)
EOSINOPHIL NFR BLD AUTO: 2 % (ref 0–6)
ERYTHROCYTE [DISTWIDTH] IN BLOOD BY AUTOMATED COUNT: 12.4 % (ref 11.6–15.1)
GFR SERPL CREATININE-BSD FRML MDRD: 95 ML/MIN/1.73SQ M
GLUCOSE SERPL-MCNC: 93 MG/DL (ref 65–140)
HCT VFR BLD AUTO: 41.4 % (ref 36.5–49.3)
HGB BLD-MCNC: 14.5 G/DL (ref 12–17)
IMM GRANULOCYTES # BLD AUTO: 0.02 THOUSAND/UL (ref 0–0.2)
IMM GRANULOCYTES NFR BLD AUTO: 0 % (ref 0–2)
LIPASE SERPL-CCNC: 91 U/L (ref 73–393)
LYMPHOCYTES # BLD AUTO: 2.35 THOUSANDS/ΜL (ref 0.6–4.47)
LYMPHOCYTES NFR BLD AUTO: 41 % (ref 14–44)
MCH RBC QN AUTO: 29.4 PG (ref 26.8–34.3)
MCHC RBC AUTO-ENTMCNC: 35 G/DL (ref 31.4–37.4)
MCV RBC AUTO: 84 FL (ref 82–98)
MONOCYTES # BLD AUTO: 0.62 THOUSAND/ΜL (ref 0.17–1.22)
MONOCYTES NFR BLD AUTO: 11 % (ref 4–12)
NEUTROPHILS # BLD AUTO: 2.58 THOUSANDS/ΜL (ref 1.85–7.62)
NEUTS SEG NFR BLD AUTO: 45 % (ref 43–75)
NRBC BLD AUTO-RTO: 0 /100 WBCS
PLATELET # BLD AUTO: 213 THOUSANDS/UL (ref 149–390)
PMV BLD AUTO: 10.7 FL (ref 8.9–12.7)
POTASSIUM SERPL-SCNC: 3.7 MMOL/L (ref 3.5–5.3)
PROT SERPL-MCNC: 7.8 G/DL (ref 6.4–8.4)
RBC # BLD AUTO: 4.93 MILLION/UL (ref 3.88–5.62)
SODIUM SERPL-SCNC: 136 MMOL/L (ref 135–147)
WBC # BLD AUTO: 5.73 THOUSAND/UL (ref 4.31–10.16)

## 2022-07-28 PROCEDURE — 83690 ASSAY OF LIPASE: CPT | Performed by: EMERGENCY MEDICINE

## 2022-07-28 PROCEDURE — 96361 HYDRATE IV INFUSION ADD-ON: CPT

## 2022-07-28 PROCEDURE — 80053 COMPREHEN METABOLIC PANEL: CPT | Performed by: EMERGENCY MEDICINE

## 2022-07-28 PROCEDURE — 74176 CT ABD & PELVIS W/O CONTRAST: CPT

## 2022-07-28 PROCEDURE — 99284 EMERGENCY DEPT VISIT MOD MDM: CPT

## 2022-07-28 PROCEDURE — 85025 COMPLETE CBC W/AUTO DIFF WBC: CPT | Performed by: EMERGENCY MEDICINE

## 2022-07-28 PROCEDURE — 96374 THER/PROPH/DIAG INJ IV PUSH: CPT

## 2022-07-28 PROCEDURE — 99284 EMERGENCY DEPT VISIT MOD MDM: CPT | Performed by: EMERGENCY MEDICINE

## 2022-07-28 PROCEDURE — 36415 COLL VENOUS BLD VENIPUNCTURE: CPT | Performed by: EMERGENCY MEDICINE

## 2022-07-28 PROCEDURE — 96375 TX/PRO/DX INJ NEW DRUG ADDON: CPT

## 2022-07-28 RX ORDER — FAMOTIDINE 20 MG/1
20 TABLET, FILM COATED ORAL 2 TIMES DAILY
Qty: 30 TABLET | Refills: 0 | Status: SHIPPED | OUTPATIENT
Start: 2022-07-28

## 2022-07-28 RX ORDER — FAMOTIDINE 20 MG/1
20 TABLET, FILM COATED ORAL ONCE
Status: COMPLETED | OUTPATIENT
Start: 2022-07-28 | End: 2022-07-28

## 2022-07-28 RX ORDER — SUCRALFATE 1 G/1
1 TABLET ORAL ONCE
Status: DISCONTINUED | OUTPATIENT
Start: 2022-07-28 | End: 2022-07-29 | Stop reason: HOSPADM

## 2022-07-28 RX ORDER — ONDANSETRON 2 MG/ML
4 INJECTION INTRAMUSCULAR; INTRAVENOUS ONCE
Status: COMPLETED | OUTPATIENT
Start: 2022-07-28 | End: 2022-07-28

## 2022-07-28 RX ORDER — SUCRALFATE ORAL 1 G/10ML
1 SUSPENSION ORAL 4 TIMES DAILY
Qty: 414 ML | Refills: 0 | Status: SHIPPED | OUTPATIENT
Start: 2022-07-28

## 2022-07-28 RX ORDER — KETOROLAC TROMETHAMINE 30 MG/ML
15 INJECTION, SOLUTION INTRAMUSCULAR; INTRAVENOUS ONCE
Status: COMPLETED | OUTPATIENT
Start: 2022-07-28 | End: 2022-07-28

## 2022-07-28 RX ORDER — DICYCLOMINE HCL 20 MG
20 TABLET ORAL ONCE
Status: COMPLETED | OUTPATIENT
Start: 2022-07-28 | End: 2022-07-28

## 2022-07-28 RX ADMIN — FAMOTIDINE 20 MG: 20 TABLET ORAL at 23:14

## 2022-07-28 RX ADMIN — KETOROLAC TROMETHAMINE 15 MG: 30 INJECTION, SOLUTION INTRAMUSCULAR at 22:24

## 2022-07-28 RX ADMIN — DICYCLOMINE HYDROCHLORIDE 20 MG: 20 TABLET ORAL at 23:14

## 2022-07-28 RX ADMIN — SODIUM CHLORIDE 1000 ML: 0.9 INJECTION, SOLUTION INTRAVENOUS at 22:24

## 2022-07-28 RX ADMIN — ONDANSETRON 4 MG: 2 INJECTION INTRAMUSCULAR; INTRAVENOUS at 22:24

## 2022-07-29 NOTE — ED PROVIDER NOTES
History  Chief Complaint   Patient presents with    Abdominal Pain     Sharp pain left abdomen getting worse tonight  Reports blood in stool  Was doing yard work Saturday and thinks he may have injured himself then  HPI  40 yo M presents with LUQ/LLQ abdominal pain that started six days ago  He was doing physical work and is unsure if that was related  Pain is sharp, constant and moderate  Noticed blood on tissue after wiping, denies rectal pain  Occasional nausea, denies fevers, diarrhea, constipation  Prior to Admission Medications   Prescriptions Last Dose Informant Patient Reported? Taking? cyclobenzaprine (FLEXERIL) 5 mg tablet   No No   Sig: Take 1 tablet (5 mg total) by mouth 3 (three) times a day as needed for muscle spasms   Patient not taking: Reported on 11/22/2021    methylPREDNISolone 4 MG tablet therapy pack   No No   Sig: Use as directed on package   Patient not taking: Reported on 2/24/2022    ofloxacin (FLOXIN) 0 3 % otic solution   No No   Sig: Administer 10 drops into the left ear 2 (two) times a day   Patient not taking: Reported on 12/21/2021       Facility-Administered Medications: None       Past Medical History:   Diagnosis Date    Ear problems     HL (hearing loss)        Past Surgical History:   Procedure Laterality Date    ADENOIDECTOMY      TONSILLECTOMY         Family History   Problem Relation Age of Onset    No Known Problems Mother     No Known Problems Father      I have reviewed and agree with the history as documented  E-Cigarette/Vaping    E-Cigarette Use Never User      E-Cigarette/Vaping Substances     Social History     Tobacco Use    Smoking status: Never Smoker    Smokeless tobacco: Never Used   Vaping Use    Vaping Use: Never used   Substance Use Topics    Alcohol use: Yes     Comment: occasional     Drug use: Never       Review of Systems   Constitutional: Negative for chills and fever  HENT: Negative for dental problem and ear pain      Eyes: Negative for pain and redness  Respiratory: Negative for cough and shortness of breath  Cardiovascular: Negative for chest pain and palpitations  Gastrointestinal: Positive for abdominal pain and blood in stool  Negative for nausea  Endocrine: Negative for polydipsia and polyphagia  Genitourinary: Negative for dysuria and frequency  Musculoskeletal: Negative for arthralgias and joint swelling  Skin: Negative for color change and rash  Neurological: Negative for dizziness and headaches  Psychiatric/Behavioral: Negative for behavioral problems and confusion  All other systems reviewed and are negative  Physical Exam  Physical Exam  Vitals and nursing note reviewed  Constitutional:       General: He is not in acute distress  Appearance: He is well-developed  He is not diaphoretic  HENT:      Head: Atraumatic  Right Ear: External ear normal       Left Ear: External ear normal       Nose: Nose normal    Eyes:      Conjunctiva/sclera: Conjunctivae normal       Pupils: Pupils are equal, round, and reactive to light  Neck:      Vascular: No JVD  Cardiovascular:      Rate and Rhythm: Normal rate and regular rhythm  Heart sounds: Normal heart sounds  No murmur heard  Pulmonary:      Effort: Pulmonary effort is normal  No respiratory distress  Breath sounds: Normal breath sounds  No wheezing  Abdominal:      General: Bowel sounds are normal  There is no distension  Palpations: Abdomen is soft  Tenderness: There is abdominal tenderness in the left upper quadrant and left lower quadrant  Musculoskeletal:         General: Normal range of motion  Cervical back: Normal range of motion and neck supple  Skin:     General: Skin is warm and dry  Capillary Refill: Capillary refill takes less than 2 seconds  Neurological:      Mental Status: He is alert and oriented to person, place, and time  Cranial Nerves: No cranial nerve deficit     Psychiatric: Behavior: Behavior normal          Vital Signs  ED Triage Vitals   Temperature Pulse Respirations Blood Pressure SpO2   07/28/22 2231 07/28/22 2140 07/28/22 2140 07/28/22 2140 07/28/22 2140   98 6 °F (37 °C) 74 16 129/73 99 %      Temp Source Heart Rate Source Patient Position - Orthostatic VS BP Location FiO2 (%)   07/28/22 2231 -- 07/28/22 2231 07/28/22 2231 --   Oral  Sitting Left arm       Pain Score       07/28/22 2224       7           Vitals:    07/28/22 2140 07/28/22 2231 07/28/22 2300   BP: 129/73 122/62 117/55   Pulse: 74 63 66   Patient Position - Orthostatic VS:  Sitting          Visual Acuity      ED Medications  Medications   sucralfate (CARAFATE) tablet 1 g (has no administration in time range)   sodium chloride 0 9 % bolus 1,000 mL (0 mL Intravenous Stopped 7/28/22 2339)   ketorolac (TORADOL) injection 15 mg (15 mg Intravenous Given 7/28/22 2224)   dicyclomine (BENTYL) tablet 20 mg (20 mg Oral Given 7/28/22 2314)   ondansetron (ZOFRAN) injection 4 mg (4 mg Intravenous Given 7/28/22 2224)   famotidine (PEPCID) tablet 20 mg (20 mg Oral Given 7/28/22 2314)       Diagnostic Studies  Results Reviewed     Procedure Component Value Units Date/Time    Comprehensive metabolic panel [[id number]] Collected: 07/28/22 2226    Lab Status: Final result Specimen: Blood from Arm, Right Updated: 07/28/22 2253     Sodium 136 mmol/L      Potassium 3 7 mmol/L      Chloride 101 mmol/L      CO2 29 mmol/L      ANION GAP 6 mmol/L      BUN 22 mg/dL      Creatinine 1 01 mg/dL      Glucose 93 mg/dL      Calcium 8 8 mg/dL      AST 19 U/L      ALT 28 U/L      Alkaline Phosphatase 98 U/L      Total Protein 7 8 g/dL      Albumin 4 1 g/dL      Total Bilirubin 0 60 mg/dL      eGFR 95 ml/min/1 73sq m     Narrative:      [place] guidelines for Chronic Kidney Disease (CKD):     Stage 1 with normal or high GFR (GFR > 90 mL/min/1 73 square meters)    Stage 2 Mild CKD (GFR = 60-89 mL/min/1 73 square meters)    Stage 3A Moderate CKD (GFR = 45-59 mL/min/1 73 square meters)    Stage 3B Moderate CKD (GFR = 30-44 mL/min/1 73 square meters)    Stage 4 Severe CKD (GFR = 15-29 mL/min/1 73 square meters)    Stage 5 End Stage CKD (GFR <15 mL/min/1 73 square meters)  Note: GFR calculation is accurate only with a steady state creatinine    Lipase [[id number]]  (Normal) Collected: 07/28/22 2226    Lab Status: Final result Specimen: Blood from Arm, Right Updated: 07/28/22 2253     Lipase 91 u/L     CBC and differential [[id number]] Collected: 07/28/22 2226    Lab Status: Final result Specimen: Blood from Arm, Right Updated: 07/28/22 2231     WBC 5 73 Thousand/uL      RBC 4 93 Million/uL      Hemoglobin 14 5 g/dL      Hematocrit 41 4 %      MCV 84 fL      MCH 29 4 pg      MCHC 35 0 g/dL      RDW 12 4 %      MPV 10 7 fL      Platelets 408 Thousands/uL      nRBC 0 /100 WBCs      Neutrophils Relative 45 %      Immat GRANS % 0 %      Lymphocytes Relative 41 %      Monocytes Relative 11 %      Eosinophils Relative 2 %      Basophils Relative 1 %      Neutrophils Absolute 2 58 Thousands/µL      Immature Grans Absolute 0 02 Thousand/uL      Lymphocytes Absolute 2 35 Thousands/µL      Monocytes Absolute 0 62 Thousand/µL      Eosinophils Absolute 0 13 Thousand/µL      Basophils Absolute 0 03 Thousands/µL                  CT abdomen pelvis wo contrast   Final Result by Selam Patel MD (07/28 2315)      No acute CT findings  Workstation performed: [id number]                    Procedures  Procedures         ED Course                                             MDM  Patient presents with abdominal pain  CT negative, labs are unremarkable and reassuring  Patient appears well  Discussed Pepcid, Carafate for possible gastritis, discussed GI follow-up    Disposition  Final diagnoses:   Abdominal pain     Time reflects when diagnosis was documented in both MDM as applicable and the Disposition within this note     Time User Action Codes Description Comment    7/28/2022 11:20 PM Mckay Bernal Add [R10 9] Abdominal pain       ED Disposition     ED Disposition   Discharge    Condition   Stable    Date/Time   Thu Jul 28, 2022 11:22 PM    Comment   John Fernández discharge to home/self care  Follow-up Information     Follow up With Specialties Details Why Contact Info Additional [facility] Gastroenterology Schedule an appointment as soon as possible for a visit   [address] [phone]  Clarisa Ameya Shannon [address] [facility], [address] [facility], [address], [facility], [address] [facility], [address] [facility]           Discharge Medication List as of 7/28/2022 11:25 PM      START taking these medications    Details   famotidine (PEPCID) 20 mg tablet Take 1 tablet (20 mg total) by mouth 2 (two) times a day, Starting Thu 7/28/2022, Normal      sucralfate (CARAFATE) 1 g/10 mL suspension Take 10 mL (1 g total) by mouth 4 (four) times a day, Starting Thu 7/28/2022, Normal         CONTINUE these medications which have NOT CHANGED    Details   cyclobenzaprine (FLEXERIL) 5 mg tablet Take 1 tablet (5 mg total) by mouth 3 (three) times a day as needed for muscle spasms, Starting Fri 7/17/2020, Normal      methylPREDNISolone 4 MG tablet therapy pack Use as directed on package, Normal      ofloxacin (FLOXIN) 0 3 % otic solution Administer 10 drops into the left ear 2 (two) times a day, Starting Sun 11/21/2021, Print             No discharge procedures on file      PDMP Review     None          ED Provider  Electronically Signed by           Handy Lawson MD  07/29/22 5897

## 2022-09-01 ENCOUNTER — OFFICE VISIT (OUTPATIENT)
Dept: URGENT CARE | Facility: MEDICAL CENTER | Age: 37
End: 2022-09-01
Payer: COMMERCIAL

## 2022-09-01 VITALS
OXYGEN SATURATION: 99 % | BODY MASS INDEX: 30.8 KG/M2 | WEIGHT: 220 LBS | DIASTOLIC BLOOD PRESSURE: 82 MMHG | RESPIRATION RATE: 18 BRPM | SYSTOLIC BLOOD PRESSURE: 150 MMHG | TEMPERATURE: 98 F | HEIGHT: 71 IN | HEART RATE: 80 BPM

## 2022-09-01 DIAGNOSIS — B02.9 HERPES ZOSTER WITHOUT COMPLICATION: Primary | ICD-10-CM

## 2022-09-01 PROCEDURE — G0382 LEV 3 HOSP TYPE B ED VISIT: HCPCS

## 2022-09-01 RX ORDER — VALACYCLOVIR HYDROCHLORIDE 1 G/1
1000 TABLET, FILM COATED ORAL 3 TIMES DAILY
Qty: 21 TABLET | Refills: 0 | Status: SHIPPED | OUTPATIENT
Start: 2022-09-01 | End: 2022-09-08

## 2022-09-01 NOTE — PROGRESS NOTES
[facility]        NAME: Omayra Bowman is a 39 y o  male  : 1985    MRN: [id number]  DATE: 2022  TIME: 9:04 AM      Assessment and Plan     Herpes zoster without complication [V58 2]  1  Herpes zoster without complication  valACYclovir (VALTREX) 1,000 mg tablet     Will treat with valtrex given less than 72 hours of symptoms, no eye involvement, and patient is not immunocompromised  Patient Instructions     Take the antiviral as directed  Can use calamine lotion on the area to help with pain and itching  Acetaminophen or ibuprofen for pain  PCP follow-up in 3-5 days  Proceed to the ER if symptoms worsen  Chief Complaint     Chief Complaint   Patient presents with    Herpes Zoster     Painful vesicular rash on right ribcage that started on Tuesday          History of Present Illness     Patient is a 78-year-old male who presents with rash on right side of abdomen for two days- 48 hours  States the pain is painful (burning) and itchy  States that at first he thought it was poison until it worsened  Denies fever or chills  Denies n/v/d  Denies DM history  States his son was just sick with hand,foot, and mouth  Review of Systems     Review of Systems   Constitutional: Negative for chills and fever  HENT: Negative for congestion, rhinorrhea and sore throat  Respiratory: Negative for cough  Gastrointestinal: Negative for diarrhea, nausea and vomiting  Skin: Positive for rash  All other systems reviewed and are negative          Current Medications       Current Outpatient Medications:     valACYclovir (VALTREX) 1,000 mg tablet, Take 1 tablet (1,000 mg total) by mouth 3 (three) times a day for 7 days, Disp: 21 tablet, Rfl: 0    cyclobenzaprine (FLEXERIL) 5 mg tablet, Take 1 tablet (5 mg total) by mouth 3 (three) times a day as needed for muscle spasms (Patient not taking: Reported on 2021 ), Disp: 21 tablet, Rfl: 0    famotidine (PEPCID) 20 mg tablet, Take 1 tablet (20 mg total) by mouth 2 (two) times a day, Disp: 30 tablet, Rfl: 0    methylPREDNISolone 4 MG tablet therapy pack, Use as directed on package (Patient not taking: Reported on 2/24/2022 ), Disp: 21 each, Rfl: 0    ofloxacin (FLOXIN) 0 3 % otic solution, Administer 10 drops into the left ear 2 (two) times a day (Patient not taking: Reported on 12/21/2021 ), Disp: 10 mL, Rfl: 0    sucralfate (CARAFATE) 1 g/10 mL suspension, Take 10 mL (1 g total) by mouth 4 (four) times a day, Disp: 414 mL, Rfl: 0    Current Allergies     Allergies as of 09/01/2022    (No Known Allergies)              The following portions of the patient's history were reviewed and updated as appropriate: allergies, current medications, past family history, past medical history, past social history, past surgical history and problem list      Past Medical History:   Diagnosis Date    Ear problems     HL (hearing loss)        Past Surgical History:   Procedure Laterality Date    ADENOIDECTOMY      TONSILLECTOMY         Family History   Problem Relation Age of Onset    No Known Problems Mother     No Known Problems Father          Medications have been verified  Objective     /82   Pulse 80   Temp 98 °F (36 7 °C)   Resp 18   Ht 5' 11" (1 803 m)   Wt 99 8 kg (220 lb)   SpO2 99%   BMI 30 68 kg/m²   No LMP for male patient  Physical Exam     Physical Exam  Vitals and nursing note reviewed  Constitutional:       General: He is not in acute distress  Appearance: Normal appearance  He is not ill-appearing, toxic-appearing or diaphoretic  HENT:      Right Ear: Tympanic membrane, ear canal and external ear normal       Left Ear: Tympanic membrane, ear canal and external ear normal       Nose: Nose normal       Mouth/Throat:      Lips: Pink  Mouth: Mucous membranes are moist       Tongue: No lesions  Palate: No lesions  Pharynx: Oropharynx is clear  Uvula midline     Eyes: General: Lids are normal       Extraocular Movements: Extraocular movements intact  Conjunctiva/sclera: Conjunctivae normal       Pupils: Pupils are equal, round, and reactive to light  Cardiovascular:      Rate and Rhythm: Normal rate  Pulses: Normal pulses  Heart sounds: Normal heart sounds, S1 normal and S2 normal    Pulmonary:      Effort: Pulmonary effort is normal       Breath sounds: Normal breath sounds and air entry  No stridor, decreased air movement or transmitted upper airway sounds  No decreased breath sounds, wheezing, rhonchi or rales  Skin:     General: Skin is warm  Capillary Refill: Capillary refill takes less than 2 seconds  Findings: Rash present  Rash is vesicular  Neurological:      General: No focal deficit present  Mental Status: He is alert  Psychiatric:         Mood and Affect: Mood normal          Behavior: Behavior normal          Thought Content:  Thought content normal          Judgment: Judgment normal

## 2022-09-01 NOTE — PATIENT INSTRUCTIONS
Take the antiviral as directed  Can use calamine lotion on the area to help with pain and itching  Acetaminophen or ibuprofen for pain  PCP follow-up in 3-5 days  Proceed to the ER if symptoms worsen

## 2022-09-02 ENCOUNTER — OFFICE VISIT (OUTPATIENT)
Dept: FAMILY MEDICINE CLINIC | Facility: CLINIC | Age: 37
End: 2022-09-02
Payer: COMMERCIAL

## 2022-09-02 VITALS
WEIGHT: 224.5 LBS | SYSTOLIC BLOOD PRESSURE: 126 MMHG | DIASTOLIC BLOOD PRESSURE: 76 MMHG | OXYGEN SATURATION: 98 % | TEMPERATURE: 97.9 F | RESPIRATION RATE: 18 BRPM | HEIGHT: 71 IN | BODY MASS INDEX: 31.43 KG/M2 | HEART RATE: 79 BPM

## 2022-09-02 DIAGNOSIS — B02.9 HERPES ZOSTER WITHOUT COMPLICATION: Primary | ICD-10-CM

## 2022-09-02 PROCEDURE — 3725F SCREEN DEPRESSION PERFORMED: CPT | Performed by: NURSE PRACTITIONER

## 2022-09-02 PROCEDURE — 99213 OFFICE O/P EST LOW 20 MIN: CPT | Performed by: NURSE PRACTITIONER

## 2022-09-02 RX ORDER — GABAPENTIN 100 MG/1
100 CAPSULE ORAL 3 TIMES DAILY
Qty: 60 CAPSULE | Refills: 0 | Status: SHIPPED | OUTPATIENT
Start: 2022-09-02

## 2022-09-02 NOTE — PROGRESS NOTES
Assessment/Plan:  Shingles and post herpetic neuralgia  Physical assessment was unremarkable or the exception of the shingles rash which in is in a paced state of resolution  On patient is having some post herpetic neuralgia did advised will send gabapentin 100 mg to the pharmacy I will advised that he start with a p m  Dose if he fails to tolerate he is to contact me back in the office otherwise use as needed  Dosing all possible side effects of the prescribed medications or medications that had been prescribed in the past were reviewed and all questions were answered  Patient verbalized agreement and understanding of the plan of care as outlined during the office visit today return to office as indicated or sooner if a problem arises  Problem List Items Addressed This Visit    None     Visit Diagnoses     Herpes zoster without complication    -  Primary    Relevant Medications    gabapentin (Neurontin) 100 mg capsule            Subjective:      Patient ID: Evonne Encarnacion is a 39 y o  male  Patient is here to be seen for a painful on right-sided rash was diagnosed with shingles on the Valtrex 3 g per day for 7 days his been the on the medication for 2 days tolerating it very well he is just concerned about the discomfort he denies any other related symptoms  The following portions of the patient's history were reviewed and updated as appropriate: allergies, current medications, past family history, past medical history, past social history, past surgical history and problem list     Review of Systems   Constitutional: Negative for appetite change and fever  HENT: Negative for congestion and trouble swallowing  Respiratory: Negative for shortness of breath  Cardiovascular: Negative for chest pain  Gastrointestinal: Negative for abdominal pain  Genitourinary: Negative for difficulty urinating  Musculoskeletal: Negative for myalgias     Skin: Positive for rash (Known shingles rash right side of body thorax)  Neurological: Negative for dizziness  Psychiatric/Behavioral: The patient is not nervous/anxious  Objective:      /76 (BP Location: Left arm, Patient Position: Sitting, Cuff Size: Large)   Pulse 79   Temp 97 9 °F (36 6 °C)   Resp 18   Ht 5' 11" (1 803 m)   Wt 102 kg (224 lb 8 oz)   SpO2 98%   BMI 31 31 kg/m²          Physical Exam  Cardiovascular:      Rate and Rhythm: Normal rate and regular rhythm  Heart sounds: Normal heart sounds  Pulmonary:      Effort: Pulmonary effort is normal       Breath sounds: Normal breath sounds  Skin:     Findings: Rash (Is also form rash right side of the thorax wrapping around posterior to anterior  No sign of infection  State of resolution ) present  Neurological:      Mental Status: He is alert

## 2022-12-26 ENCOUNTER — HOSPITAL ENCOUNTER (EMERGENCY)
Facility: HOSPITAL | Age: 37
Discharge: HOME | End: 2022-12-26
Attending: EMERGENCY MEDICINE

## 2022-12-26 ENCOUNTER — APPOINTMENT (EMERGENCY)
Dept: RADIOLOGY | Facility: HOSPITAL | Age: 37
End: 2022-12-26

## 2022-12-26 VITALS
SYSTOLIC BLOOD PRESSURE: 154 MMHG | HEIGHT: 70 IN | DIASTOLIC BLOOD PRESSURE: 97 MMHG | HEART RATE: 77 BPM | BODY MASS INDEX: 31.5 KG/M2 | OXYGEN SATURATION: 99 % | RESPIRATION RATE: 17 BRPM | TEMPERATURE: 98.3 F | WEIGHT: 220 LBS

## 2022-12-26 DIAGNOSIS — R07.9 CHEST PAIN: Primary | ICD-10-CM

## 2022-12-26 LAB
ALBUMIN SERPL BCP-MCNC: 4.1 G/DL (ref 3.5–5)
ALP SERPL-CCNC: 122 U/L (ref 46–116)
ALT SERPL W P-5'-P-CCNC: 27 U/L (ref 12–78)
ANION GAP SERPL CALCULATED.3IONS-SCNC: 9 MMOL/L (ref 4–13)
AST SERPL W P-5'-P-CCNC: 29 U/L (ref 5–45)
ATRIAL RATE: 77 BPM
BASOPHILS # BLD AUTO: 0.05 THOUSANDS/ÂΜL (ref 0–0.1)
BASOPHILS NFR BLD AUTO: 1 % (ref 0–1)
BILIRUB SERPL-MCNC: 0.5 MG/DL (ref 0.2–1)
BUN SERPL-MCNC: 16 MG/DL (ref 5–25)
CALCIUM SERPL-MCNC: 8.7 MG/DL (ref 8.3–10.1)
CARDIAC TROPONIN I PNL SERPL HS: 2 NG/L
CHLORIDE SERPL-SCNC: 103 MMOL/L (ref 96–108)
CO2 SERPL-SCNC: 26 MMOL/L (ref 21–32)
CREAT SERPL-MCNC: 0.84 MG/DL (ref 0.6–1.3)
EOSINOPHIL # BLD AUTO: 0.13 THOUSAND/ÂΜL (ref 0–0.61)
EOSINOPHIL NFR BLD AUTO: 2 % (ref 0–6)
ERYTHROCYTE [DISTWIDTH] IN BLOOD BY AUTOMATED COUNT: 12.2 % (ref 11.6–15.1)
GFR SERPL CREATININE-BSD FRML MDRD: 111 ML/MIN/1.73SQ M
GLUCOSE SERPL-MCNC: 101 MG/DL (ref 65–140)
HCT VFR BLD AUTO: 43.7 % (ref 36.5–49.3)
HGB BLD-MCNC: 15.3 G/DL (ref 12–17)
IMM GRANULOCYTES # BLD AUTO: 0.02 THOUSAND/UL (ref 0–0.2)
IMM GRANULOCYTES NFR BLD AUTO: 0 % (ref 0–2)
LYMPHOCYTES # BLD AUTO: 1.82 THOUSANDS/ÂΜL (ref 0.6–4.47)
LYMPHOCYTES NFR BLD AUTO: 31 % (ref 14–44)
MCH RBC QN AUTO: 29.4 PG (ref 26.8–34.3)
MCHC RBC AUTO-ENTMCNC: 35 G/DL (ref 31.4–37.4)
MCV RBC AUTO: 84 FL (ref 82–98)
MONOCYTES # BLD AUTO: 0.55 THOUSAND/ÂΜL (ref 0.17–1.22)
MONOCYTES NFR BLD AUTO: 9 % (ref 4–12)
NEUTROPHILS # BLD AUTO: 3.4 THOUSANDS/ÂΜL (ref 1.85–7.62)
NEUTS SEG NFR BLD AUTO: 57 % (ref 43–75)
NRBC BLD AUTO-RTO: 0 /100 WBCS
P AXIS: 70 DEGREES
PLATELET # BLD AUTO: 219 THOUSANDS/UL (ref 149–390)
PMV BLD AUTO: 11 FL (ref 8.9–12.7)
POTASSIUM SERPL-SCNC: 4.1 MMOL/L (ref 3.5–5.3)
PR INTERVAL: 188 MS
PROT SERPL-MCNC: 8.1 G/DL (ref 6.4–8.4)
QRS AXIS: 80 DEGREES
QRSD INTERVAL: 100 MS
QT INTERVAL: 372 MS
QTC INTERVAL: 420 MS
RBC # BLD AUTO: 5.2 MILLION/UL (ref 3.88–5.62)
SODIUM SERPL-SCNC: 138 MMOL/L (ref 135–147)
T WAVE AXIS: 42 DEGREES
VENTRICULAR RATE: 77 BPM
WBC # BLD AUTO: 5.97 THOUSAND/UL (ref 4.31–10.16)

## 2022-12-26 NOTE — ED PROVIDER NOTES
History  Chief Complaint   Patient presents with   • Chest Pain     Patient co chest pain and bilateral shoulder pain that started a few days ago  Patient describes pain as "cramping "      44yo male with no significant past medical history presenting for evaluation of chest pain x 2 days  Pain is left-sided and is described as cramping  Pain is mild to moderate in intensity  Pain was initially intermittent but has been constant since yesterday  He also reports right shoulder discomfort  He denies any trauma  He denies any provoking or alleviating factors  Specifically, he denies any pleuritic or exertional pain  He is otherwise asymptomatic and denies shortness of breath, dizziness, syncope, diaphoresis, nausea, vomiting, abdominal pain  No personal or family history of cardiac disease  History provided by:  Patient   used: No    Chest Pain  Pain location:  L chest  Pain quality comment:  Cramping  Pain radiates to:  Does not radiate  Pain radiates to the back: no    Pain severity:  Moderate  Onset quality:  Gradual  Duration:  2 days  Timing:  Constant  Progression:  Unchanged  Chronicity:  New  Relieved by:  Nothing  Worsened by:  Nothing tried  Associated symptoms: no abdominal pain, no cough, no diaphoresis, no dizziness, no fever, no lower extremity edema, no nausea, no numbness, no shortness of breath, no syncope, not vomiting and no weakness        Prior to Admission Medications   Prescriptions Last Dose Informant Patient Reported? Taking?    cyclobenzaprine (FLEXERIL) 5 mg tablet   No No   Sig: Take 1 tablet (5 mg total) by mouth 3 (three) times a day as needed for muscle spasms   Patient not taking: Reported on 11/22/2021    famotidine (PEPCID) 20 mg tablet   No No   Sig: Take 1 tablet (20 mg total) by mouth 2 (two) times a day   gabapentin (Neurontin) 100 mg capsule   No No   Sig: Take 1 capsule (100 mg total) by mouth 3 (three) times a day   methylPREDNISolone 4 MG tablet therapy pack   No No   Sig: Use as directed on package   Patient not taking: Reported on 2/24/2022    ofloxacin (FLOXIN) 0 3 % otic solution   No No   Sig: Administer 10 drops into the left ear 2 (two) times a day   Patient not taking: Reported on 12/21/2021    sucralfate (CARAFATE) 1 g/10 mL suspension   No No   Sig: Take 10 mL (1 g total) by mouth 4 (four) times a day   Patient not taking: Reported on 9/2/2022   valACYclovir (VALTREX) 1,000 mg tablet   No No   Sig: Take 1 tablet (1,000 mg total) by mouth 3 (three) times a day for 7 days      Facility-Administered Medications: None       Past Medical History:   Diagnosis Date   • Ear problems    • HL (hearing loss)        Past Surgical History:   Procedure Laterality Date   • ADENOIDECTOMY     • TONSILLECTOMY         Family History   Problem Relation Age of Onset   • No Known Problems Mother    • No Known Problems Father      I have reviewed and agree with the history as documented  E-Cigarette/Vaping   • E-Cigarette Use Never User      E-Cigarette/Vaping Substances   • Nicotine No    • THC No    • CBD No    • Flavoring No    • Other No    • Unknown No      Social History     Tobacco Use   • Smoking status: Never   • Smokeless tobacco: Never   Vaping Use   • Vaping Use: Never used   Substance Use Topics   • Alcohol use: Yes     Comment: occasional    • Drug use: Never       Review of Systems   Constitutional: Negative for chills, diaphoresis and fever  HENT: Negative for drooling and voice change  Eyes: Negative for discharge and redness  Respiratory: Negative for cough, shortness of breath and stridor  Cardiovascular: Positive for chest pain  Negative for leg swelling and syncope  Gastrointestinal: Negative for abdominal pain, nausea and vomiting  Musculoskeletal: Negative for neck pain and neck stiffness  Skin: Negative for color change and rash  Neurological: Negative for dizziness, seizures, syncope, weakness and numbness  Psychiatric/Behavioral: Negative for confusion  The patient is not nervous/anxious  All other systems reviewed and are negative  Physical Exam  Physical Exam  Vitals and nursing note reviewed  Constitutional:       General: He is not in acute distress  Appearance: He is well-developed  He is not diaphoretic  HENT:      Head: Normocephalic and atraumatic  Right Ear: External ear normal       Left Ear: External ear normal    Eyes:      General: No scleral icterus  Right eye: No discharge  Left eye: No discharge  Conjunctiva/sclera: Conjunctivae normal    Cardiovascular:      Rate and Rhythm: Normal rate and regular rhythm  Pulses:           Radial pulses are 2+ on the right side and 2+ on the left side  Heart sounds: Normal heart sounds  No murmur heard  Pulmonary:      Effort: Pulmonary effort is normal  No respiratory distress  Breath sounds: Normal breath sounds  No stridor  No wheezing or rales  Musculoskeletal:         General: No deformity  Normal range of motion  Cervical back: Normal range of motion and neck supple  Right lower leg: No edema  Left lower leg: No edema  Skin:     General: Skin is warm and dry  Neurological:      Mental Status: He is alert  He is not disoriented  GCS: GCS eye subscore is 4  GCS verbal subscore is 5  GCS motor subscore is 6     Psychiatric:         Behavior: Behavior normal          Vital Signs  ED Triage Vitals [12/26/22 0844]   Temperature Pulse Respirations Blood Pressure SpO2   98 3 °F (36 8 °C) 77 17 154/97 99 %      Temp Source Heart Rate Source Patient Position - Orthostatic VS BP Location FiO2 (%)   Oral Monitor Sitting Left arm --      Pain Score       6           Vitals:    12/26/22 0844   BP: 154/97   Pulse: 77   Patient Position - Orthostatic VS: Sitting         Visual Acuity      ED Medications  Medications - No data to display    Diagnostic Studies  Results Reviewed     Procedure Component Value Units Date/Time    HS Troponin 0hr (reflex protocol) [[id number]]  (Normal) Collected: 12/26/22 0923    Lab Status: Final result Specimen: Blood from Arm, Left Updated: 12/26/22 0954     hs TnI 0hr 2 ng/L     Comprehensive metabolic panel [[id number]]  (Abnormal) Collected: 12/26/22 0923    Lab Status: Final result Specimen: Blood from Arm, Left Updated: 12/26/22 0947     Sodium 138 mmol/L      Potassium 4 1 mmol/L      Chloride 103 mmol/L      CO2 26 mmol/L      ANION GAP 9 mmol/L      BUN 16 mg/dL      Creatinine 0 84 mg/dL      Glucose 101 mg/dL      Calcium 8 7 mg/dL      AST 29 U/L      ALT 27 U/L      Alkaline Phosphatase 122 U/L      Total Protein 8 1 g/dL      Albumin 4 1 g/dL      Total Bilirubin 0 50 mg/dL      eGFR 111 ml/min/1 73sq m     Narrative:      National Kidney Disease Foundation guidelines for Chronic Kidney Disease (CKD):   •  Stage 1 with normal or high GFR (GFR > 90 mL/min/1 73 square meters)  •  Stage 2 Mild CKD (GFR = 60-89 mL/min/1 73 square meters)  •  Stage 3A Moderate CKD (GFR = 45-59 mL/min/1 73 square meters)  •  Stage 3B Moderate CKD (GFR = 30-44 mL/min/1 73 square meters)  •  Stage 4 Severe CKD (GFR = 15-29 mL/min/1 73 square meters)  •  Stage 5 End Stage CKD (GFR <15 mL/min/1 73 square meters)  Note: GFR calculation is accurate only with a steady state creatinine    CBC and differential [[id number]] Collected: 12/26/22 0923    Lab Status: Final result Specimen: Blood from Arm, Left Updated: 12/26/22 0929     WBC 5 97 Thousand/uL      RBC 5 20 Million/uL      Hemoglobin 15 3 g/dL      Hematocrit 43 7 %      MCV 84 fL      MCH 29 4 pg      MCHC 35 0 g/dL      RDW 12 2 %      MPV 11 0 fL      Platelets 656 Thousands/uL      nRBC 0 /100 WBCs      Neutrophils Relative 57 %      Immat GRANS % 0 %      Lymphocytes Relative 31 %      Monocytes Relative 9 %      Eosinophils Relative 2 %      Basophils Relative 1 %      Neutrophils Absolute 3 40 Thousands/µL      Immature Grans Absolute 0 02 Thousand/uL      Lymphocytes Absolute 1 82 Thousands/µL      Monocytes Absolute 0 55 Thousand/µL      Eosinophils Absolute 0 13 Thousand/µL      Basophils Absolute 0 05 Thousands/µL                  XR chest 2 views   ED Interpretation by Milagro Guerin PA-C (12/26 1009)   No acute abnormality      Final Result by Ashleigh Buckner MD (12/26 1229)      No acute cardiopulmonary disease  Workstation performed: [id number]                    Procedures  ECG 12 Lead Documentation Only    Date/Time: 12/26/2022 4:34 PM  Performed by: Milagro Guerin PA-C  Authorized by: Milagro Guerin PA-C     Indications / Diagnosis:  CP  ECG reviewed by me, the ED Provider: yes    Patient location:  ED  Rate:     ECG rate:  77    ECG rate assessment: normal    Rhythm:     Rhythm: sinus rhythm    Ectopy:     Ectopy: none    QRS:     QRS axis:  Normal    QRS intervals:  Normal  Conduction:     Conduction: normal    ST segments:     ST segments:  Normal  T waves:     T waves: normal               ED Course             HEART Risk Score    Flowsheet Row Most Recent Value   Heart Score Risk Calculator    History 0 Filed at: 12/26/2022 1823   ECG 0 Filed at: 12/26/2022 1823   Age 0 Filed at: 12/26/2022 1823   Risk Factors 0 Filed at: 12/26/2022 1823   Troponin 0 Filed at: 12/26/2022 1823   HEART Score 0 Filed at: 12/26/2022 1823                        SBIRT 20yo+    Flowsheet Row Most Recent Value   SBIRT (23 yo +)    In order to provide better care to our patients, we are screening all of our patients for alcohol and drug use  Would it be okay to ask you these screening questions? Yes Filed at: 12/26/2022 3378   Initial Alcohol Screen: US AUDIT-C     1  How often do you have a drink containing alcohol? 0 Filed at: 12/26/2022 0920   2  How many drinks containing alcohol do you have on a typical day you are drinking? 0 Filed at: 12/26/2022 0920   3a  Male UNDER 65:  How often do you have five or more drinks on one occasion? 0 Filed at: 12/26/2022 0920   3b  FEMALE Any Age, or MALE 65+: How often do you have 4 or more drinks on one occassion? 0 Filed at: 12/26/2022 0920   Audit-C Score 0 Filed at: 12/26/2022 6214   ALEX: How many times in the past year have you    Used an illegal drug or used a prescription medication for non-medical reasons? Never Filed at: 12/26/2022 0920                    [facility]  Number of Diagnoses or Management Options  Chest pain: new and requires workup  Diagnosis management comments: 37yoM presenting for chest pain x 2 days  Left sided, non-radiating  No exertional or pleuritic pain  Otherwise asymptomatic  He is afebrile and hemodynamically stable  Patient is well appearing in no acute distress  Initial ED plan: Check cardiac labs, EKG, and chest x-ray  Final assessment: Labs unremarkable including normal blood counts, renal function, electrolytes  EKG reveals normal sinus rhythm without ischemic changes and high-sensitivity troponin is normal   Chest x-ray is clear  Heart score is 0  No indication for admission at this time  Advised close PCP follow-up  ED return precautions discussed  Patient expressed understanding and is agreeable to plan  Patient discharged in stable condition           Amount and/or Complexity of Data Reviewed  Clinical lab tests: reviewed and ordered  Tests in the radiology section of CPT®: ordered and reviewed  Tests in the medicine section of CPT®: reviewed and ordered  Review and summarize past medical records: yes  Independent visualization of images, tracings, or specimens: yes    Risk of Complications, Morbidity, and/or Mortality  Presenting problems: moderate  Diagnostic procedures: moderate  Management options: moderate        Disposition  Final diagnoses:   Chest pain     Time reflects when diagnosis was documented in both MDM as applicable and the Disposition within this note     Time User Action Codes Description Comment    12/26/2022 10:11 AM Wally Curry Add [R07 9] Chest pain       ED Disposition     ED Disposition   Discharge    Condition   Stable    Date/Time   Mon Dec 26, 2022 10:11 AM    Comment   Lana Regan discharge to home/self care  Follow-up Information     Follow up With Specialties Details Why Contact Info Additional Information    Rimma Garcia MD Family Medicine Schedule an appointment as soon as possible for a visit   [id number] [facility] Male [address] [facility] Emergency Department Emergency Medicine  If symptoms worsen [address] Emergency Department, [address] [place] [address], [id number]          Discharge Medication List as of 12/26/2022 10:12 AM      CONTINUE these medications which have NOT CHANGED    Details   cyclobenzaprine (FLEXERIL) 5 mg tablet Take 1 tablet (5 mg total) by mouth 3 (three) times a day as needed for muscle spasms, Starting Fri 7/17/2020, Normal      famotidine (PEPCID) 20 mg tablet Take 1 tablet (20 mg total) by mouth 2 (two) times a day, Starting u 7/28/2022, Normal      gabapentin (Neurontin) 100 mg capsule Take 1 capsule (100 mg total) by mouth 3 (three) times a day, Starting Fri 9/2/2022, Normal      methylPREDNISolone 4 MG tablet therapy pack Use as directed on package, Normal      ofloxacin (FLOXIN) 0 3 % otic solution Administer 10 drops into the left ear 2 (two) times a day, Starting Sun 11/21/2021, Print      sucralfate (CARAFATE) 1 g/10 mL suspension Take 10 mL (1 g total) by mouth 4 (four) times a day, Starting u 7/28/2022, Normal      valACYclovir (VALTREX) 1,000 mg tablet Take 1 tablet (1,000 mg total) by mouth 3 (three) times a day for 7 days, Starting Thu 9/1/2022, Until Thu 9/8/2022, Normal             No discharge procedures on file      PDMP Review     None          ED Provider  Electronically Signed by           [facility] MINDY Owen PA-C  12/26/22 1828

## 2023-05-26 ENCOUNTER — OFFICE VISIT (OUTPATIENT)
Dept: FAMILY MEDICINE CLINIC | Facility: CLINIC | Age: 38
End: 2023-05-26

## 2023-05-26 VITALS
BODY MASS INDEX: 32.07 KG/M2 | HEART RATE: 92 BPM | WEIGHT: 224 LBS | OXYGEN SATURATION: 98 % | SYSTOLIC BLOOD PRESSURE: 138 MMHG | HEIGHT: 70 IN | TEMPERATURE: 98.4 F | DIASTOLIC BLOOD PRESSURE: 86 MMHG | RESPIRATION RATE: 18 BRPM

## 2023-05-26 DIAGNOSIS — M25.512 CHRONIC LEFT SHOULDER PAIN: Primary | ICD-10-CM

## 2023-05-26 DIAGNOSIS — G89.29 CHRONIC LEFT SHOULDER PAIN: Primary | ICD-10-CM

## 2023-05-26 NOTE — PROGRESS NOTES
Name: Florencio Lobo      : 1985      MRN: [id number]  Encounter Provider: Merlyn Rinne, CRNP  Encounter Date: 2023   Encounter department: [address] [place] [address]    Assessment & Plan     1  Chronic left shoulder pain    Unable to assess and determine exact origin of shoulder discomfort That comes down and other range of motion did elicit discomfort superior part of shoulder deep palpation inconclusive  Did advise her like to get an x-ray to rule out any acute processes if inconclusive or car normal possibly consider MRI or referral to Ortho will not put that in as of yet  We will contact him with results of the imaging when they are available  Subjective      Patient is here for left shoulder pain its been off and on for several years  Patient denies any injury but has been throwing football with his son and it definitely hurts after those types of activities he has been taking aspirin with little relief he is concerned for possible rotator cuff injury unsure possible bursitis  He denies any other related symptoms  Review of Systems   Constitutional: Negative for appetite change and fever  HENT: Negative for congestion and trouble swallowing  Respiratory: Negative for shortness of breath  Cardiovascular: Negative for chest pain  Gastrointestinal: Negative for abdominal pain  Genitourinary: Negative for difficulty urinating  Musculoskeletal: Positive for arthralgias (Left shoulder pain ongoing off and on for the last several years)  Negative for myalgias  Neurological: Negative for dizziness  Psychiatric/Behavioral: The patient is not nervous/anxious          Current Outpatient Medications on File Prior to Visit   Medication Sig   • cyclobenzaprine (FLEXERIL) 5 mg tablet Take 1 tablet (5 mg total) by mouth 3 (three) times a day as needed for muscle spasms (Patient not taking: Reported on 2021)   • famotidine (PEPCID) 20 mg tablet Take 1 tablet "(20 mg total) by mouth 2 (two) times a day (Patient not taking: Reported on 5/26/2023)   • gabapentin (Neurontin) 100 mg capsule Take 1 capsule (100 mg total) by mouth 3 (three) times a day (Patient not taking: Reported on 5/26/2023)   • methylPREDNISolone 4 MG tablet therapy pack Use as directed on package (Patient not taking: Reported on 2/24/2022)   • ofloxacin (FLOXIN) 0 3 % otic solution Administer 10 drops into the left ear 2 (two) times a day (Patient not taking: Reported on 12/21/2021)   • sucralfate (CARAFATE) 1 g/10 mL suspension Take 10 mL (1 g total) by mouth 4 (four) times a day (Patient not taking: Reported on 9/2/2022)   • valACYclovir (VALTREX) 1,000 mg tablet Take 1 tablet (1,000 mg total) by mouth 3 (three) times a day for 7 days       Objective     /86 (BP Location: Right arm, Patient Position: Sitting, Cuff Size: Large)   Pulse 92   Temp 98 4 °F (36 9 °C) (Temporal)   Resp 18   Ht 5' 10\" (1 778 m)   Wt 102 kg (224 lb)   SpO2 98%   BMI 32 14 kg/m²     Physical Exam  Constitutional:       Appearance: Normal appearance  Cardiovascular:      Rate and Rhythm: Normal rate and regular rhythm  Heart sounds: Normal heart sounds  Pulmonary:      Effort: Pulmonary effort is normal    Musculoskeletal:         General: Tenderness (Left shoulder inconclusive for right rotator cuff injury did advise x-ray is warranted ) present  Neurological:      Mental Status: He is alert         ANNAMARIE Del Castillo  "

## 2023-05-30 ENCOUNTER — TELEPHONE (OUTPATIENT)
Dept: FAMILY MEDICINE CLINIC | Facility: CLINIC | Age: 38
End: 2023-05-30

## 2023-05-30 NOTE — TELEPHONE ENCOUNTER
Patient's wife called and stated that when he was seen he was told if the pain was not any better to call and you would prescribe something for him  He states that it is not better but actually getting a little worse  He had an xray done but does not have the results

## 2023-06-01 DIAGNOSIS — M54.41 ACUTE BILATERAL LOW BACK PAIN WITH RIGHT-SIDED SCIATICA: ICD-10-CM

## 2023-06-01 RX ORDER — METHYLPREDNISOLONE 4 MG/1
TABLET ORAL
Qty: 21 EACH | Refills: 0 | Status: SHIPPED | OUTPATIENT
Start: 2023-06-01

## 2023-06-08 DIAGNOSIS — M25.512 CHRONIC PAIN IN LEFT SHOULDER: Primary | ICD-10-CM

## 2023-06-08 DIAGNOSIS — G89.29 CHRONIC PAIN IN LEFT SHOULDER: Primary | ICD-10-CM

## 2023-07-24 ENCOUNTER — TELEPHONE (OUTPATIENT)
Dept: FAMILY MEDICINE CLINIC | Facility: CLINIC | Age: 38
End: 2023-07-24

## 2023-07-24 NOTE — TELEPHONE ENCOUNTER
Patient states insurance will not cover mri without having physical therapy first. Needs an order placed. Patient mentioned pain is still there previous medication that was given didn't help and pain is now extended to his elbow.

## 2023-07-25 DIAGNOSIS — G89.29 CHRONIC LEFT SHOULDER PAIN: Primary | ICD-10-CM

## 2023-07-25 DIAGNOSIS — M25.512 CHRONIC LEFT SHOULDER PAIN: Primary | ICD-10-CM

## 2023-07-30 ENCOUNTER — OFFICE VISIT (OUTPATIENT)
Dept: URGENT CARE | Facility: CLINIC | Age: 38
End: 2023-07-30
Payer: COMMERCIAL

## 2023-07-30 VITALS
DIASTOLIC BLOOD PRESSURE: 86 MMHG | OXYGEN SATURATION: 99 % | TEMPERATURE: 97.6 F | HEART RATE: 67 BPM | RESPIRATION RATE: 18 BRPM | SYSTOLIC BLOOD PRESSURE: 143 MMHG

## 2023-07-30 DIAGNOSIS — L25.5 RHUS DERMATITIS: Primary | ICD-10-CM

## 2023-07-30 PROCEDURE — G0382 LEV 3 HOSP TYPE B ED VISIT: HCPCS

## 2023-07-30 RX ORDER — PREDNISONE 10 MG/1
TABLET ORAL
Qty: 45 TABLET | Refills: 0 | Status: SHIPPED | OUTPATIENT
Start: 2023-07-30

## 2023-07-30 NOTE — PROGRESS NOTES
[facility]    NAME: Geraldo Pena is a 40 y.o. male  : 1985    MRN: [id number]  DATE: 2023  TIME: 11:30 AM    Assessment and Plan   Rhus dermatitis [L25.5]  1. Rhus dermatitis  predniSONE 10 mg tablet        Suspected contact dermatitis. Started on steroids for symptoms - complete entire course and do not stop or your rash may come back. Continue supportive measures such as calamine lotion or oatmeal baths to the area. Follow up with PCP if not improving. Patient Instructions     Take all tablets of prednisone at the same time for each day, as prescribed. Recommend taking in the morning, with food. If you are also taking an anti-inflammatory such as Aleve or ibuprofen, recommend stopping or only using over the counter doses while you are on higher doses of prednisone (40-60mg). May use Benadryl and/oe Zyrtec as needed for itching. Be careful of drowsiness especially with Benadryl; do not take before driving or operating heavy machinery. May use cool compresses, oatmeal baths, calamine for poison ivy and emollients such as Aveeno oatmeal for eczema as needed for comfort. For poison ivy can use Tecnu cream (OTC) to wash after potential contact with poison ivy. Try to use unscented/sensitive formula soaps, lotions, and detergents. No new medications. Limit hot showers. Follow up with PCP in 5-7 days if symptoms are not improving or sooner with fever, thick yellow pus drainage, or any other concern for infection. Chief Complaint     Chief Complaint   Patient presents with   • Poison Ivy     Pt c/o poison ivy rash that started friday         History of Present Illness       Presents with poison ivy symptoms that started yesterday after known exposure to poison ivy. He denies shortness of breath/throat closing symptoms. Previously he has needed steroids in the past. Feels rash has been spreading. Currently to bilateral arms and abdomen.      Rash  The current episode started yesterday. The problem has been rapidly worsening since onset. The rash is diffuse. The rash is characterized by itchiness. He was exposed to plant contact. Pertinent negatives include no anorexia, congestion, cough, diarrhea, facial edema, fatigue, fever, joint pain, rhinorrhea, shortness of breath, sore throat or vomiting. Review of Systems   Review of Systems   Constitutional: Negative for fatigue and fever. HENT: Negative for congestion, rhinorrhea and sore throat. Eyes: Negative for pain. Respiratory: Negative for cough and shortness of breath. Gastrointestinal: Negative for anorexia, diarrhea and vomiting. Musculoskeletal: Negative for joint pain. Skin: Positive for rash. Neurological: Negative for weakness. Hematological: Does not bruise/bleed easily. Current Medications       Current Outpatient Medications:   •  predniSONE 10 mg tablet, Take 40 mg (4 tablets) for 5 days, take 30 mg (3 tablets) for 5 days, take 20 mg (2 tablets) for 5 days. , Disp: 45 tablet, Rfl: 0  •  cyclobenzaprine (FLEXERIL) 5 mg tablet, Take 1 tablet (5 mg total) by mouth 3 (three) times a day as needed for muscle spasms (Patient not taking: Reported on 11/22/2021), Disp: 21 tablet, Rfl: 0  •  famotidine (PEPCID) 20 mg tablet, Take 1 tablet (20 mg total) by mouth 2 (two) times a day (Patient not taking: Reported on 5/26/2023), Disp: 30 tablet, Rfl: 0  •  gabapentin (Neurontin) 100 mg capsule, Take 1 capsule (100 mg total) by mouth 3 (three) times a day (Patient not taking: Reported on 5/26/2023), Disp: 60 capsule, Rfl: 0  •  ofloxacin (FLOXIN) 0.3 % otic solution, Administer 10 drops into the left ear 2 (two) times a day (Patient not taking: Reported on 12/21/2021), Disp: 10 mL, Rfl: 0  •  sucralfate (CARAFATE) 1 g/10 mL suspension, Take 10 mL (1 g total) by mouth 4 (four) times a day (Patient not taking: Reported on 9/2/2022), Disp: 414 mL, Rfl: 0  •  valACYclovir (VALTREX) 1,000 mg tablet, Take 1 tablet (1,000 mg total) by mouth 3 (three) times a day for 7 days, Disp: 21 tablet, Rfl: 0    Current Allergies     Allergies as of 07/30/2023   • (No Known Allergies)            The following portions of the patient's history were reviewed and updated as appropriate: allergies, current medications, past family history, past medical history, past social history, past surgical history and problem list.     Past Medical History:   Diagnosis Date   • Ear problems    • HL (hearing loss)        Past Surgical History:   Procedure Laterality Date   • ADENOIDECTOMY     • TONSILLECTOMY         Family History   Problem Relation Age of Onset   • No Known Problems Mother    • No Known Problems Father          Medications have been verified. Objective   /86   Pulse 67   Temp 97.6 °F (36.4 °C) (Temporal)   Resp 18   SpO2 99%        Physical Exam     Physical Exam  Vitals reviewed. Constitutional:       Appearance: Normal appearance. Cardiovascular:      Rate and Rhythm: Normal rate and regular rhythm. Pulses: Normal pulses. Heart sounds: Normal heart sounds. No murmur heard. Pulmonary:      Effort: Pulmonary effort is normal. No respiratory distress. Breath sounds: Normal breath sounds. Skin:     General: Skin is warm and dry. Capillary Refill: Capillary refill takes less than 2 seconds. Findings: Rash present. Comments: Bilateral lower arms and abdomen with scattered hives, in patches/lines. No weeping, drainage, erythema, or edema. Neurological:      General: No focal deficit present. Mental Status: He is alert and oriented to person, place, and time.    Psychiatric:         Mood and Affect: Mood normal.         Behavior: Behavior normal.

## 2023-07-30 NOTE — PATIENT INSTRUCTIONS
Take all tablets of prednisone at the same time for each day, as prescribed. Recommend taking in the morning, with food. If you are also taking an anti-inflammatory such as Aleve or ibuprofen, recommend stopping or only using over the counter doses while you are on higher doses of prednisone (40-60mg). May use Benadryl and/oe Zyrtec as needed for itching. Be careful of drowsiness especially with Benadryl; do not take before driving or operating heavy machinery. May use cool compresses, oatmeal baths, calamine for poison ivy and emollients such as Aveeno oatmeal for eczema as needed for comfort. For poison ivy can use Tecnu cream (OTC) to wash after potential contact with poison ivy. Try to use unscented/sensitive formula soaps, lotions, and detergents. No new medications. Limit hot showers. Follow up with PCP in 5-7 days if symptoms are not improving or sooner with fever, thick yellow pus drainage, or any other concern for infection.

## 2023-08-10 NOTE — PROGRESS NOTES
PT Evaluation     Today's date: 08/10/23  Patient name: Denver Embs  : 1985  MRN: [id number]  Referring provider: ANNAMARIE Ibarra  Dx:   Encounter Diagnosis     ICD-10-CM    1. Chronic left shoulder pain  M25.512     G89.29                       Assessment  Assessment details: Delia Armstrong a 40 y.o. male referred with primary diagnosis of Chronic left shoulder pain  (primary encounter diagnosis) . Patient presents with the following functional limitations: Pain with overhead activity, lifting his children, reaching behind his back, and laying on left side. Patient's symptoms are consistent with impingement syndrome and AC joint dysfunction. Treatment to include: Manual therapy techniques, extremity/core strengthening, neuromuscular control exercises, instruction in a comprehensive HEP, and modalities as needed. They will benefit from skilled PT services to address the above functional deficits and to decrease pain to promote a return to their premorbid level of function. Impairments: abnormal or restricted ROM, activity intolerance, impaired physical strength, lacks appropriate home exercise program, pain with function, scapular dyskinesis and weight-bearing intolerance  Functional limitations: Pain with overhead activity, lifting his children, reaching behind his back, and laying on left side. Understanding of Dx/Px/POC: good   Prognosis: good    Goals  STG (4 weeks)  1. Patient will demonstrate the ability to correct posture with minimal VC's  2. Patient will demonstrate 25% gains in shoulder ROM in all deficient planes  3. Patient will report pain as a 4-5/10 at worst with all normal activities  4. Patient will report 50% reduction in sleep disturbances related to pain  LTG (8 weeks)  1. Patient will report pain as a 0-1/10 at worst with normal activities  2. Patient will sleep through the night without disturbances related to pain  3.  Patient will demonstrate shoulder ROM WNL to facilitate improved function with overhead activities  4. Patient will demonstrate shoulder and scapuler strength WNL to improve posture and restore normal joint kinematics  5. Patient will be independent and compliant with a HEP in order to maintain gains made with skilled PT services. Plan  Plan details: 1x/wk due to high insurance copay  Patient would benefit from: skilled physical therapy  Planned modality interventions: cryotherapy  Planned therapy interventions: joint mobilization, IASTM, manual therapy, neuromuscular re-education, patient education, strengthening, stretching, therapeutic activities, therapeutic exercise and home exercise program  Frequency: 1x week  Duration in weeks: 8  Plan of Care beginning date: 2023  Plan of Care expiration date: 10/6/2023  Treatment plan discussed with: patient        Subjective Evaluation    History of Present Illness  Mechanism of injury: Patient reports chronic, recurrent pain in his left shoulder for the past 5 years ago. A couple of months ago, symptoms got really bad, so he saw his physician and had x-rays taken which were negative. Was scheduled to have an MRI, but couldn't get one approved by insurance. Presents today for outpatient PT evaluation. Pain  Current pain ratin  At best pain ratin  At worst pain ratin  Location: Left anterior shoulder (deep inside)  Quality: throbbing and sharp  Exacerbated by: Overhead activities, throwing a ball, reaching behind back, and lying on his side. Social Support  Lives with: spouse and young children (3 and 2 y.o.)    Employment status: working ( for [employer]. heavy lifting)  Hand dominance: left      Diagnostic Tests    FCE comments: Denies paresthesias into left UE  Feels like left UE is weak, mainly because he compensates and tries to use his right UE      Objective     Static Posture     Shoulders  Rounded. Scapulae  Left protracted and right protracted.     Tenderness     Left Shoulder   Tenderness in the [facility] joint and biceps tendon (proximal). No tenderness in the subacromial bursa and supraspinatus tendon. Active Range of Motion   Left Shoulder   Flexion: 122 degrees   Abduction: 75 degrees   External rotation 45°: 40 degrees with pain  Internal rotation 45°: 80 degrees     Right Shoulder   Flexion: 164 degrees   Abduction: 178 degrees   External rotation 45°: 80 degrees   Internal rotation 45°: 90 degrees     Passive Range of Motion   Left Shoulder   Flexion: WFL and with pain  Abduction: WFL and with pain  External rotation 45°: 50 degrees with pain  Internal rotation 45°: 90 degrees     Strength/Myotome Testing     Left Shoulder     Planes of Motion   Flexion: 4   Abduction: 4   External rotation at 0°: 4   Internal rotation at 0°: 4+     Tests     Left Shoulder   Positive AC shear, empty can, Hawkin's and Speed's. Negative apprehension, clunk, drop arm, painful arc and relocation. Right Shoulder   Positive AC shear, apprehension, clunk, drop arm, empty can, Hawkin's, painful arc, Speed's and relocation. Precautions: Hearing loss  Access Code: [email or web address]  URL: [email or web address]  Date: 08/11/2023  Prepared by: Juan Szymanski  POC expires Auth Status Unit limit Start date  Expiration date PT/OT + Visit Limit? [address] [place] [address]   10/6 NA NA 8/11 12/31 BOMN $35 copay              Visit/Unit Tracking  AUTH Status:  Date 8/10              NA Used 1               Remaining                  QR     Manuals 8/11            Pec minor TPR JF            PROM left shoulder ER             GH Jt mobs             AC jt mobs inf, post             Neuro Re-Ed             Pt education [address] anatomy and pathology. Posture. Impingement Syndrome.   SELENA chowdhury             Webslide rows M,L Instructed            Prone row             Prone I,Y,T                                                    Ther Ex             UBE 1/2 F/R             T-band shoulder IR Instructed T-band shoulder ER Instructed            Cane flexion             Cane ABD             Corner pec stretch Instructed                                      Ther Activity                                       Gait Training                                       Modalities

## 2023-08-11 ENCOUNTER — EVALUATION (OUTPATIENT)
Dept: PHYSICAL THERAPY | Facility: CLINIC | Age: 38
End: 2023-08-11
Payer: COMMERCIAL

## 2023-08-11 DIAGNOSIS — M25.512 CHRONIC LEFT SHOULDER PAIN: Primary | ICD-10-CM

## 2023-08-11 DIAGNOSIS — G89.29 CHRONIC LEFT SHOULDER PAIN: Primary | ICD-10-CM

## 2023-08-11 PROCEDURE — 97110 THERAPEUTIC EXERCISES: CPT | Performed by: PHYSICAL THERAPIST

## 2023-08-11 PROCEDURE — 97161 PT EVAL LOW COMPLEX 20 MIN: CPT | Performed by: PHYSICAL THERAPIST

## 2023-08-11 PROCEDURE — 97112 NEUROMUSCULAR REEDUCATION: CPT | Performed by: PHYSICAL THERAPIST

## 2023-08-17 NOTE — PROGRESS NOTES
Daily Note     Today's date: 2023  Patient name: Brittney Enrique  : 1985  MRN: [id number]  Referring provider: ANNAMARIE Sharif  Dx:   Encounter Diagnosis     ICD-10-CM    1. Chronic left shoulder pain  M25.512     G89.29                      Subjective: Patient reports significant pain with TE. Shoulder is feeling worse      Objective: See treatment diary below      Assessment: Tolerated treatment fair. Patient had 8/10 pain low row through only minimal ROM, so it was DC'd. Advised patient to perform all TE in comfortable ROM. Patient states left shoulder feels a little better after PT session. Plan: Continue per plan of care. Precautions: Hearing loss  Access Code: [email or web address]  URL: [email or web address]  Date: 2023  Prepared by: Rafa Williamson  POC expires Auth Status Unit limit Start date  Expiration date PT/OT + Visit Limit? [address] [place] [address]   10/6 NA NA  BOMN $35 copay              Visit/Unit Tracking  AUTH Status:  Date 8/10 8/18             NA Used 1 2              Remaining                  QR     Manuals            Pec minor TPR JF            PROM left shoulder ER             GH Jt mobs  NV           IASTM left prox. bicep  JF           AC jt mobs inf, post  NV           Neuro Re-Ed             Pt education [address] anatomy and pathology. Posture. Impingement Syndrome.   AC jt             Webslide rows M,L Instructed Black x20 M only           Prone row  2"x20           Prone I,Y,T  x20 ea                                                  Ther Ex             UBE 1/2 F/R  4'/4'           T-band shoulder IR Instructed Black x20           T-band shoulder ER Instructed Black x20           Cane flexion  supine x20           Cane ABD             Corner pec stretch Instructed                                      Ther Activity                                       Gait Training                                       Modalities

## 2023-08-18 ENCOUNTER — OFFICE VISIT (OUTPATIENT)
Dept: PHYSICAL THERAPY | Facility: CLINIC | Age: 38
End: 2023-08-18
Payer: COMMERCIAL

## 2023-08-18 DIAGNOSIS — G89.29 CHRONIC LEFT SHOULDER PAIN: Primary | ICD-10-CM

## 2023-08-18 DIAGNOSIS — M25.512 CHRONIC LEFT SHOULDER PAIN: Primary | ICD-10-CM

## 2023-08-18 PROCEDURE — 97110 THERAPEUTIC EXERCISES: CPT | Performed by: PHYSICAL THERAPIST

## 2023-08-18 PROCEDURE — 97112 NEUROMUSCULAR REEDUCATION: CPT | Performed by: PHYSICAL THERAPIST

## 2023-08-18 PROCEDURE — 97140 MANUAL THERAPY 1/> REGIONS: CPT | Performed by: PHYSICAL THERAPIST

## 2023-08-25 ENCOUNTER — OFFICE VISIT (OUTPATIENT)
Dept: PHYSICAL THERAPY | Facility: CLINIC | Age: 38
End: 2023-08-25
Payer: COMMERCIAL

## 2023-08-25 DIAGNOSIS — G89.29 CHRONIC LEFT SHOULDER PAIN: Primary | ICD-10-CM

## 2023-08-25 DIAGNOSIS — M25.512 CHRONIC LEFT SHOULDER PAIN: Primary | ICD-10-CM

## 2023-08-25 PROCEDURE — 97112 NEUROMUSCULAR REEDUCATION: CPT

## 2023-08-25 PROCEDURE — 97110 THERAPEUTIC EXERCISES: CPT

## 2023-08-25 PROCEDURE — 97140 MANUAL THERAPY 1/> REGIONS: CPT

## 2023-08-25 NOTE — PROGRESS NOTES
Daily Note     Today's date: 2023  Patient name: Dom Rao  : 1985  MRN: [id number]  Referring provider: ANNAMARIE Aguilar  Dx:   Encounter Diagnosis     ICD-10-CM    1. Chronic left shoulder pain  M25.512     G89.29           Start Time: 07  Stop Time: 0810  Total time in clinic (min): 40 minutes    Subjective: Pt noted that he does have some pain in his L shoulder. Noted about a 4/10 p! Neil Cali At this time no follow up appointments scheduled with his referring provider. Objective: See treatment diary below      Assessment: Continued with treatment session with focus on L shoulder. Tolerated treatment well. Pt did have pain with ER and required to hold ER TB this visit. Demonstrated proper scapular retraction with all exercises this visit. Noted some relief from IASTM. Patient demonstrated fatigue post treatment, exhibited good technique with therapeutic exercises and would benefit from continued PT. Education reviewed with patient with verbal agreement and understanding.   - HEP compliance - advised to complete on a daily basis   - DOMS - 24 to 48 hours of muscle soreness. - IASTM education on benefits as well as side effects may have some petchia as well as soreness/ redness in area. - Modalities to be used as needed CP/HP 10 - 20 min on and 60 min off prior to additional application if needed. Plan: Continue per plan of care. NV is scheduled for 23. Precautions: Hearing loss  Access Code: [address] [facility] - updated on 23    POC expires Auth Status Unit limit Start date  Expiration date PT/OT + Visit Limit? [address] [facility] [address]   10/6 NA NA  BOMN $35 copay              Visit/Unit Tracking  AUTH Status:  Date             NA Used 1 2 3             Remaining                       Manuals           Pec minor TPR JF            PROM left shoulder ER             GH Jt mobs  NV           IASTM left prox.  bicep  JF SC          AC jt mobs inf, post  NV           Neuro Re-Ed             Pt education [address] anatomy and pathology. Posture. Impingement Syndrome. AC jt   DOMS and HEP           Webslide rows M,L Instructed Black x20 M only Black 2x 10          Prone row  2"x20 2" 20x           Prone I,Y,T  x20 ea 20x ea. Ther Ex             UBE 1/2 F/R  4'/4' 4'/4'          T-band shoulder IR Instructed Black x20 Black 2x 10           T-band shoulder ER Instructed Black x20 P!  Hold           Cane flexion  supine x20 standing 5" 20x           Cane ABD standing    5" 2x10          Corner pec stretch Instructed            S/L ER    2x10                        Ther Activity                                       Gait Training                                       Modalities

## 2023-09-01 ENCOUNTER — OFFICE VISIT (OUTPATIENT)
Dept: PHYSICAL THERAPY | Facility: CLINIC | Age: 38
End: 2023-09-01
Payer: COMMERCIAL

## 2023-09-01 DIAGNOSIS — M25.512 CHRONIC LEFT SHOULDER PAIN: Primary | ICD-10-CM

## 2023-09-01 DIAGNOSIS — G89.29 CHRONIC LEFT SHOULDER PAIN: Primary | ICD-10-CM

## 2023-09-01 PROCEDURE — 97110 THERAPEUTIC EXERCISES: CPT | Performed by: PHYSICAL THERAPIST

## 2023-09-01 PROCEDURE — 97140 MANUAL THERAPY 1/> REGIONS: CPT | Performed by: PHYSICAL THERAPIST

## 2023-09-01 PROCEDURE — 97112 NEUROMUSCULAR REEDUCATION: CPT | Performed by: PHYSICAL THERAPIST

## 2023-09-01 NOTE — PROGRESS NOTES
Daily Note     Today's date: 2023  Patient name: Sakshi Max  : 1985  MRN: [id number]  Referring provider: ANNAMARIE Kern  Dx:   Encounter Diagnosis     ICD-10-CM    1. Chronic left shoulder pain  M25.512     G89.29                      Subjective: Patient thinks the ROM in his shoulder might have improved a little bit, but his pain is unchanged. Will be on vacation for the next 2 weeks      Objective: See treatment diary below      Assessment: Tolerated treatment well. Patient would benefit from continued PT. Had some pain in shoulder with prone I,Y,T and Body Blade. Plan: Continue per plan of care. Precautions: Hearing loss  Access Code: [address] - updated on 23    POC expires Auth Status Unit limit Start date  Expiration date PT/OT + Visit Limit? Copay/ Co- Insurance   10/6 NA NA  BOMN $35 copay              Visit/Unit Tracking  AUTH Status:  Date            NA Used 1 2 3 4            Remaining                       Manuals          Pec minor TPR JF            PROM left shoulder ER             GH Jt mobs  NV  JF         IASTM left prox. bicep  JF SC JF         AC jt mobs inf, post  NV  JF         Neuro Re-Ed             Pt education [address] anatomy and pathology. Posture. Impingement Syndrome. AC jt   DOMS and HEP           Webslide rows M,L Instructed Black x20 M only Black 2x 10 Black 2x 10         Prone row  2"x20 2" 20x           Prone I,Y,T  x20 ea 20x ea. 20x ea. Ther Ex             UBE / F/R  4'/4' 4'/4' 4'/4'         T-band shoulder IR Instructed Black x20 Black 2x 10  Black 2x 10          T-band shoulder ER Instructed Black x20 P!  Hold           Cane flexion  supine x20 standing 5" 20x           Cane ABD standing    5" 2x10          Corner pec stretch Instructed            S/L ER    2x10           Body Blade IR/ER a   2x30"         Body blade S/I at 90 deg flexion and ABD    2x30"         Ther Activity                                       Gait Training                                       Modalities

## 2023-09-22 ENCOUNTER — EVALUATION (OUTPATIENT)
Dept: PHYSICAL THERAPY | Facility: CLINIC | Age: 38
End: 2023-09-22
Payer: COMMERCIAL

## 2023-09-22 DIAGNOSIS — G89.29 CHRONIC LEFT SHOULDER PAIN: Primary | ICD-10-CM

## 2023-09-22 DIAGNOSIS — M25.512 CHRONIC LEFT SHOULDER PAIN: Primary | ICD-10-CM

## 2023-09-22 PROCEDURE — 97110 THERAPEUTIC EXERCISES: CPT | Performed by: PHYSICAL THERAPIST

## 2023-09-22 NOTE — PROGRESS NOTES
PT Discharge    Today's date: 23  Patient name: Marisa Perera  : 1985  MRN: [id number]  Referring provider: ANNAMARIE Campbell  Dx:   Encounter Diagnosis     ICD-10-CM    1. Chronic left shoulder pain  M25.512     G89.29           Start Time: 730           Assessment  Assessment details: Patient began Physical Therapy 6 weeks ago and has attended a total of 5 PT sessions. He has consistently performed his HEP during that time. Overall, he reports no significant changes in his shoulder pain. Shoulder ROM and strength are WFL. He continues to have pain and difficulty with overhead activity, picking up and carrying his children, laying on his left side, and reaching behind his back. At this time, patient has maximized the benefit of skilled PT services. Additional diagnostic imaging may be helpful in determining next appropriate course of treatment to maximize pain relief and promote a return to his prior level of function. Impairments: abnormal or restricted ROM, activity intolerance, impaired physical strength, lacks appropriate home exercise program, pain with function, scapular dyskinesis and weight-bearing intolerance  Functional limitations: Pain with overhead activity, lifting his children, reaching behind his back, and laying on left side. Understanding of Dx/Px/POC: good   Prognosis: good    Goals  STG (4 weeks)  1. Patient will demonstrate the ability to correct posture with minimal VC's - met  2. Patient will demonstrate 25% gains in shoulder ROM in all deficient planes - met  3. Patient will report pain as a 4-5/10 at worst with all normal activities - not met  4. Patient will report 50% reduction in sleep disturbances related to pain - not met  LTG (8 weeks)  1. Patient will report pain as a 0-1/10 at worst with normal activities- not met  2. Patient will sleep through the night without disturbances related to pain - not met  3.  Patient will demonstrate shoulder ROM WNL to facilitate improved function with overhead activities - met  4. Patient will demonstrate shoulder and scapuler strength WNL to improve posture and restore normal joint kinematics - met  5. Patient will be independent and compliant with a HEP in order to maintain gains made with skilled PT services. - met      Plan  Planned therapy interventions: patient education, therapeutic exercise and home exercise program  Plan of Care beginning date: 2023  Plan of Care expiration date: 10/6/2023  Treatment plan discussed with: patient        Subjective Evaluation    History of Present Illness  Mechanism of injury: It is now 6 weeks since patient began physical therapy. He has attended 5 PT sessions and performed his HEP consistently. During that time he reports no significant changes in his symptoms. Patient Goals  Patient goals for therapy: decreased pain, increased motion and increased strength    Pain  Current pain ratin  At best pain ratin  At worst pain ratin  Location: Left anterior shoulder (deep inside)  Quality: throbbing and sharp  Exacerbated by: Overhead activities, throwing a ball, reaching behind back, and lying on his side. Progression: no change    Social Support  Lives with: spouse and young children (3 and 2 y.o.)    Employment status: working ( for [employer]. heavy lifting)  Hand dominance: left      Diagnostic Tests    FCE comments: Denies paresthesias into left UE  Feels like left UE is weak, mainly because he compensates and tries to use his right UE  (+) sleep disturbances every hour at night. Treatments  Previous treatment: medication  Current treatment: medication        Objective     Static Posture     Shoulders  Rounded. Scapulae  Left protracted and right protracted. Tenderness     Left Shoulder   Tenderness in the [facility] joint and biceps tendon (proximal). No tenderness in the subacromial bursa and supraspinatus tendon.      Active Range of Motion   Left Shoulder Flexion: 145 degrees with pain  Abduction: 130 degrees with pain  External rotation 45°: 62 degrees with pain  Internal rotation 45°: 90 degrees     Right Shoulder   Flexion: 164 degrees   Abduction: 178 degrees   External rotation 45°: 80 degrees   Internal rotation 45°: 90 degrees     Passive Range of Motion   Left Shoulder   Flexion: WFL and with pain  Abduction: WFL and with pain  External rotation 45°: 68 degrees with pain  Internal rotation 45°: 90 degrees     Strength/Myotome Testing     Left Shoulder     Planes of Motion   Flexion: 4+   Abduction: 4+   External rotation at 0°: 4+   Internal rotation at 0°: 4+     Tests     Left Shoulder   Positive AC shear, empty can, Hawkin's and Speed's. Negative apprehension, clunk, drop arm, painful arc and relocation. Right Shoulder   Positive AC shear, apprehension, clunk, drop arm, empty can, Hawkin's, painful arc, Speed's and relocation. Precautions: Hearing loss  Access Code: [address] [facility] - updated on 8/25/23    POC expires Auth Status Unit limit Start date  Expiration date PT/OT + Visit Limit? [address]   10/6 NA NA 8/11 12/31 BOMN $35 copay              Visit/Unit Tracking  AUTH Status:  Date 8/11 8/18 8/25 9/1 9/22          NA Used 1 2 3 4 5           Remaining                       Manuals 8/11 8/18 8/25 9/1 9/22        Pec minor TPR JF            PROM left shoulder ER             GH Jt mobs  NV  JF         IASTM left prox. bicep  JF SC JF         AC jt mobs inf, post  NV  JF         RE performed     JF        Neuro Re-Ed             Pt education [facility] anatomy and pathology. Posture. Impingement Syndrome. AC jt   DOMS and HEP           Webslide rows M,L Instructed Black x20 M only Black 2x 10 Black 2x 10         Prone row  2"x20 2" 20x           Prone I,Y,T  x20 ea 20x ea. 20x ea.                                                  Ther Ex             UBE 1/2 F/R  4'/4' 4'/4' 4'/4'         T-band shoulder IR Instructed Black x20 Black 2x 10 Black 2x 10          T-band shoulder ER Instructed Black x20 P!  Hold           Cane flexion  supine x20 standing 5" 20x           Cane ABD standing    5" 2x10          Corner pec stretch Instructed            S/L ER    2x10           Body Blade IR/ER a   2x30"         Body blade S/I at 90 deg flexion and ABD    2x30"         Ther Activity                                       Gait Training                                       Modalities

## 2023-09-29 ENCOUNTER — TELEPHONE (OUTPATIENT)
Age: 38
End: 2023-09-29

## 2023-09-29 NOTE — TELEPHONE ENCOUNTER
Pt said he had shoulder pain and was referred to PT. He has finished PT and it is not any better. Pt wants to know if he should get an MRI now or what the next step is. Please, let pt know.

## 2023-10-02 DIAGNOSIS — M25.512 CHRONIC LEFT SHOULDER PAIN: Primary | ICD-10-CM

## 2023-10-02 DIAGNOSIS — G89.29 CHRONIC LEFT SHOULDER PAIN: Primary | ICD-10-CM

## 2023-10-02 NOTE — TELEPHONE ENCOUNTER
Left detailed message on  that order for MRI has been placed. Asked patient to call back with any questions or concerns.

## 2023-10-13 ENCOUNTER — HOSPITAL ENCOUNTER (OUTPATIENT)
Dept: RADIOLOGY | Facility: HOSPITAL | Age: 38
End: 2023-10-13
Payer: COMMERCIAL

## 2023-10-13 ENCOUNTER — HOSPITAL ENCOUNTER (OUTPATIENT)
Dept: MRI IMAGING | Facility: HOSPITAL | Age: 38
End: 2023-10-13
Payer: COMMERCIAL

## 2023-10-13 DIAGNOSIS — M25.512 CHRONIC LEFT SHOULDER PAIN: ICD-10-CM

## 2023-10-13 DIAGNOSIS — T15.90XS: ICD-10-CM

## 2023-10-13 DIAGNOSIS — G89.29 CHRONIC LEFT SHOULDER PAIN: ICD-10-CM

## 2023-10-13 PROCEDURE — 73221 MRI JOINT UPR EXTREM W/O DYE: CPT

## 2023-10-13 PROCEDURE — G1004 CDSM NDSC: HCPCS

## 2023-10-19 DIAGNOSIS — S73.192A ACETABULAR LABRUM TEAR, LEFT, INITIAL ENCOUNTER: Primary | ICD-10-CM

## 2023-10-27 ENCOUNTER — OFFICE VISIT (OUTPATIENT)
Dept: OBGYN CLINIC | Facility: MEDICAL CENTER | Age: 38
End: 2023-10-27
Payer: COMMERCIAL

## 2023-10-27 ENCOUNTER — TELEPHONE (OUTPATIENT)
Age: 38
End: 2023-10-27

## 2023-10-27 VITALS
SYSTOLIC BLOOD PRESSURE: 148 MMHG | HEIGHT: 70 IN | WEIGHT: 225 LBS | DIASTOLIC BLOOD PRESSURE: 78 MMHG | HEART RATE: 85 BPM | BODY MASS INDEX: 32.21 KG/M2

## 2023-10-27 DIAGNOSIS — S73.192A ACETABULAR LABRUM TEAR, LEFT, INITIAL ENCOUNTER: ICD-10-CM

## 2023-10-27 DIAGNOSIS — M19.012 OSTEOARTHRITIS OF GLENOHUMERAL JOINT, LEFT: Primary | ICD-10-CM

## 2023-10-27 PROCEDURE — 99203 OFFICE O/P NEW LOW 30 MIN: CPT | Performed by: ORTHOPAEDIC SURGERY

## 2023-10-27 RX ORDER — NAPROXEN 500 MG/1
500 TABLET ORAL 2 TIMES DAILY WITH MEALS
Qty: 20 TABLET | Refills: 0 | Status: SHIPPED | OUTPATIENT
Start: 2023-10-27

## 2023-10-27 NOTE — TELEPHONE ENCOUNTER
Caller: Patient    Doctor: Yair    Reason for call: Patient was just seen he states he would prefer to have medication route instead of injections   Please advise    Call back#: [phone]

## 2023-10-27 NOTE — PROGRESS NOTES
Ortho Sports Medicine Shoulder New Patient Visit     Assesment:   40 y.o. male left shoulder moderate GH OA    Plan:    Conservative treatment:    Ice to shoulder 1-2 times daily, for 20 minutes at a time. PT for ROM and strengthening to shoulder, rotator cuff, scapular stabilizers. Imaging: All imaging from today was reviewed by myself and explained to the patient. Injection:    Order placed to receive ultra sound guided corticosteroid injection into their glenohumeral joint      Surgery:     No surgery is recommended at this point, continue with conservative treatment plan as noted. Did instruct that the ultimate surgical solution would be a TSA, but instructed that we will refrain at this tie due to his young age and continue conservative treatment. Follow up:    Return if symptoms worsen or fail to improve. Chief Complaint   Patient presents with    Left Shoulder - Pain       History of Present Illness: The patient is a 40 y.o., left hand dominant male whose occupation is [employer] employee, referred to me by their primary care physician, seen in clinic for consultation of left shoulder pain. The patient denies a history of diabetes. The patient denies a history of thyroid disorder. Pain is located anterior. The patient rates the pain as a 6/10. The pain has been present for 6 months. The patient denies specific injury, but states that his left shoulder has been bothering him for the last 5 to 6 months. Patient states that he did play football back in high school but denies any injuries or trauma at that time. Patient denies being a heavy weightlifter currently or previously. Patient does experience clicking and catching within the shoulder with lateral movements. Patient initially presented to his PCP who obtained x-ray imaging and had the patient complete formal physical therapy but after not experiencing resolution of symptoms an MRI was obtained.  He is here now following up regarding the results of that. He denies any prior corticosteroid injections or prior surgeries. The pain is characterized as dull, achy. The pain is present daily. Pain is improved by rest.  Pain is aggravated by overhead activity and abduction. Symptoms include clicking and catching. The patient denies weakness. The patient denies numbness and tingling. The patient has tried rest, ice, NSAIDS, and physical therapy. Shoulder Surgical History:  None    Past Medical, Social and Family History:  Past Medical History:   Diagnosis Date    Ear problems     HL (hearing loss)      Past Surgical History:   Procedure Laterality Date    ADENOIDECTOMY      TONSILLECTOMY       No Known Allergies  Current Outpatient Medications on File Prior to Visit   Medication Sig Dispense Refill    cyclobenzaprine (FLEXERIL) 5 mg tablet Take 1 tablet (5 mg total) by mouth 3 (three) times a day as needed for muscle spasms (Patient not taking: Reported on 11/22/2021) 21 tablet 0    famotidine (PEPCID) 20 mg tablet Take 1 tablet (20 mg total) by mouth 2 (two) times a day (Patient not taking: Reported on 5/26/2023) 30 tablet 0    gabapentin (Neurontin) 100 mg capsule Take 1 capsule (100 mg total) by mouth 3 (three) times a day (Patient not taking: Reported on 5/26/2023) 60 capsule 0    ofloxacin (FLOXIN) 0.3 % otic solution Administer 10 drops into the left ear 2 (two) times a day (Patient not taking: Reported on 12/21/2021) 10 mL 0    predniSONE 10 mg tablet Take 40 mg (4 tablets) for 5 days, take 30 mg (3 tablets) for 5 days, take 20 mg (2 tablets) for 5 days.  (Patient not taking: Reported on 10/27/2023) 45 tablet 0    sucralfate (CARAFATE) 1 g/10 mL suspension Take 10 mL (1 g total) by mouth 4 (four) times a day (Patient not taking: Reported on 9/2/2022) 414 mL 0    valACYclovir (VALTREX) 1,000 mg tablet Take 1 tablet (1,000 mg total) by mouth 3 (three) times a day for 7 days 21 tablet 0     No current facility-administered medications on file prior to visit. Social History     Socioeconomic History    Marital status: Single     Spouse name: Not on file    Number of children: Not on file    Years of education: Not on file    Highest education level: Not on file   Occupational History    Not on file   Tobacco Use    Smoking status: Never    Smokeless tobacco: Never   Vaping Use    Vaping Use: Never used   Substance and Sexual Activity    Alcohol use: Yes     Comment: occasional     Drug use: Never    Sexual activity: Not on file   Other Topics Concern    Not on file   Social History Narrative    Do you currently or have you served in the [address] [place] [address] orderTalk:   No      Were you activated, into active duty, as a member of the [email or web address] or as a Reservist:   No      Occupation:         Exercise level:   Occasional      Diet:   Regular      Alcohol intake: Moderate      Caffeine intake:   None      Chewing tobacco:   none      Social Determinants of Health     Financial Resource Strain: Not on file   Food Insecurity: Not on file   Transportation Needs: Not on file   Physical Activity: Not on file   Stress: Not on file   Social Connections: Not on file   Intimate Partner Violence: Not on file   Housing Stability: Not on file         I have reviewed the past medical, surgical, social and family history, medications and allergies as documented in the EMR. Review of systems: ROS is negative other than that noted in the HPI. Constitutional: Negative for fatigue and fever. HENT: Negative for sore throat. Respiratory: Negative for shortness of breath. Cardiovascular: Negative for chest pain. Gastrointestinal: Negative for abdominal pain. Endocrine: Negative for cold intolerance and heat intolerance. Genitourinary: Negative for flank pain. Musculoskeletal: Negative for back pain. Skin: Negative for rash. Allergic/Immunologic: Negative for immunocompromised state.    Neurological: Negative for dizziness. Psychiatric/Behavioral: Negative for agitation. Physical Exam:    Blood pressure 148/78, pulse 85, height 5' 10" (1.778 m), weight 102 kg (225 lb). General/Constitutional: NAD, well developed, well nourished  HENT: Normocephalic, atraumatic  CV: Intact distal pulses, regular rate  Resp: No respiratory distress or labored breathing  GI: Soft and non-tender   Lymphatic: No lymphadenopathy palpated  Neuro: Alert and Oriented x 3, no focal deficits  Psych: Normal mood, normal affect, normal judgement, normal behavior  Skin: Warm, dry, no rashes, no erythema      Shoulder focused exam:       RIGHT LEFT    Scapula Atrophy Negative Negative     Winging Negative Negative     Protraction Negative Negative    Rotator cuff SS 5/5 5/5     IS 5/5 5/5     SubS 5/5 5/5    ROM     170     ER0 60 60                   IRb T6    Deferred due to pain    TTP: AC Negative Positive     Biceps Negative Positive     Coracoid Negative Negative    Special Tests: O'Briens Negative Negative     Rutherford-shear Negative Negative     Cross body Adduction Negative Negative     Speeds  Negative Negative     Severino's Negative Negative     Whipple Negative Negative       Neer Negative Negative     Aquino Negative Negative    Instability: Apprehension & relocation not tested not tested     Load & shift not tested not tested    Other: Crank Negative Negative                 UE NV Exam: +2 Radial pulses bilaterally  Sensation intact to light touch C5-T1 bilaterally, Radial/median/ulnar nerve motor intact      Bilateral elbow, wrist, and and forearm ROM full, painless with passive ROM, no ttp or crepitance throughout extremities below shoulder joint    Cervical ROM is full without pain, numbness or tingling      Shoulder Imaging    X-rays of the left shoulder were unable to be reviewed from outside health network.     MRI of the left shoulder were reviewed, which demonstrate moderate glenohumeral joint OA with cartilage thinning of the humeral head and glenoid with some areas of full thickness loss . I have reviewed the radiology report and agree with their impression.

## 2024-02-03 ENCOUNTER — OFFICE VISIT (OUTPATIENT)
Dept: URGENT CARE | Facility: MEDICAL CENTER | Age: 39
End: 2024-02-03
Payer: COMMERCIAL

## 2024-02-03 VITALS
RESPIRATION RATE: 18 BRPM | DIASTOLIC BLOOD PRESSURE: 88 MMHG | OXYGEN SATURATION: 99 % | WEIGHT: 225 LBS | HEIGHT: 70 IN | BODY MASS INDEX: 32.21 KG/M2 | HEART RATE: 90 BPM | SYSTOLIC BLOOD PRESSURE: 128 MMHG | TEMPERATURE: 98.1 F

## 2024-02-03 DIAGNOSIS — J20.9 ACUTE BRONCHITIS, UNSPECIFIED ORGANISM: Primary | ICD-10-CM

## 2024-02-03 PROCEDURE — G0383 LEV 4 HOSP TYPE B ED VISIT: HCPCS | Performed by: STUDENT IN AN ORGANIZED HEALTH CARE EDUCATION/TRAINING PROGRAM

## 2024-02-03 RX ORDER — PREDNISONE 50 MG/1
50 TABLET ORAL DAILY
Qty: 5 TABLET | Refills: 0 | Status: SHIPPED | OUTPATIENT
Start: 2024-02-03 | End: 2024-02-08

## 2024-02-03 RX ORDER — AZITHROMYCIN 250 MG/1
TABLET, FILM COATED ORAL
Qty: 6 TABLET | Refills: 0 | Status: SHIPPED | OUTPATIENT
Start: 2024-02-03 | End: 2024-02-07

## 2024-02-03 NOTE — PROGRESS NOTES
"  [facility]        NAME: Ted Fritz is a 38 y.o. male  : 1985    MRN: [id number]  DATE: February 3, 2024  TIME: 8:34 AM    Assessment and Plan   Acute bronchitis, unspecified organism [J20.9]  1. Acute bronchitis, unspecified organism  azithromycin (ZITHROMAX) 250 mg tablet    predniSONE 50 mg tablet            Patient Instructions       Follow up with PCP in 3-5 days.  Proceed to  ER if symptoms worsen.    Chief Complaint     Chief Complaint   Patient presents with   • Cough   • Shortness of Breath     Productive \"green\" cough ongoing for two weeks; associated with shortness of breath with exertion          History of Present Illness       Cough  This is a new problem. Episode onset: 2 weeks. The cough is Productive of purulent sputum. Associated symptoms include nasal congestion, rhinorrhea and shortness of breath (on exertion x 2 days). Pertinent negatives include no chest pain, chills, ear congestion, ear pain, fever or wheezing. He has tried OTC cough suppressant for the symptoms. The treatment provided no relief. There is no history of asthma.     Review of Systems   Review of Systems   Constitutional:  Negative for chills and fever.   HENT:  Positive for rhinorrhea. Negative for ear pain.    Respiratory:  Positive for cough and shortness of breath (on exertion x 2 days). Negative for wheezing.    Cardiovascular:  Negative for chest pain.       Current Medications       Current Outpatient Medications:   •  azithromycin (ZITHROMAX) 250 mg tablet, Take 2 tablets today then 1 tablet daily x 4 days, Disp: 6 tablet, Rfl: 0  •  predniSONE 50 mg tablet, Take 1 tablet (50 mg total) by mouth daily for 5 days, Disp: 5 tablet, Rfl: 0  •  cyclobenzaprine (FLEXERIL) 5 mg tablet, Take 1 tablet (5 mg total) by mouth 3 (three) times a day as needed for muscle spasms (Patient not taking: Reported on 2021), Disp: 21 tablet, Rfl: 0  •  famotidine (PEPCID) 20 mg tablet, Take 1 tablet (20 mg total) " "by mouth 2 (two) times a day (Patient not taking: Reported on 5/26/2023), Disp: 30 tablet, Rfl: 0  •  gabapentin (Neurontin) 100 mg capsule, Take 1 capsule (100 mg total) by mouth 3 (three) times a day (Patient not taking: Reported on 5/26/2023), Disp: 60 capsule, Rfl: 0  •  naproxen (EC NAPROSYN) 500 MG EC tablet, Take 1 tablet (500 mg total) by mouth 2 (two) times a day with meals, Disp: 20 tablet, Rfl: 0  •  ofloxacin (FLOXIN) 0.3 % otic solution, Administer 10 drops into the left ear 2 (two) times a day (Patient not taking: Reported on 12/21/2021), Disp: 10 mL, Rfl: 0  •  sucralfate (CARAFATE) 1 g/10 mL suspension, Take 10 mL (1 g total) by mouth 4 (four) times a day (Patient not taking: Reported on 9/2/2022), Disp: 414 mL, Rfl: 0  •  valACYclovir (VALTREX) 1,000 mg tablet, Take 1 tablet (1,000 mg total) by mouth 3 (three) times a day for 7 days, Disp: 21 tablet, Rfl: 0    Current Allergies     Allergies as of 02/03/2024   • (No Known Allergies)            The following portions of the patient's history were reviewed and updated as appropriate: allergies, current medications, past family history, past medical history, past social history, past surgical history and problem list.     Past Medical History:   Diagnosis Date   • Ear problems    • HL (hearing loss)        Past Surgical History:   Procedure Laterality Date   • ADENOIDECTOMY     • TONSILLECTOMY         Family History   Problem Relation Age of Onset   • No Known Problems Mother    • No Known Problems Father          Medications have been verified.        Objective   /88   Pulse 90   Temp 98.1 °F (36.7 °C) (Temporal)   Resp 18   Ht 5' 10\" (1.778 m)   Wt 102 kg (225 lb)   SpO2 99%   BMI 32.28 kg/m²   No LMP for male patient.       Physical Exam     Physical Exam  Constitutional:       General: He is not in acute distress.     Appearance: He is well-developed. He is ill-appearing. He is not toxic-appearing.   HENT:      Head: Normocephalic and " atraumatic.      Right Ear: External ear normal.      Left Ear: External ear normal.      Nose: No congestion.      Mouth/Throat:      Pharynx: Posterior oropharyngeal erythema present. No oropharyngeal exudate.   Eyes:      Extraocular Movements: Extraocular movements intact.   Cardiovascular:      Rate and Rhythm: Normal rate.      Heart sounds: No murmur heard.  Pulmonary:      Effort: Pulmonary effort is normal.      Breath sounds: Normal breath sounds.   Neurological:      General: No focal deficit present.      Mental Status: He is alert and oriented to person, place, and time.   Psychiatric:         Mood and Affect: Mood normal.

## 2024-02-19 ENCOUNTER — HOSPITAL ENCOUNTER (OUTPATIENT)
Dept: MRI IMAGING | Facility: HOSPITAL | Age: 39
Discharge: HOME | End: 2024-02-19
Payer: COMMERCIAL

## 2024-02-19 ENCOUNTER — APPOINTMENT (OUTPATIENT)
Dept: RADIOLOGY | Facility: HOSPITAL | Age: 39
End: 2024-02-19
Payer: COMMERCIAL

## 2024-02-19 ENCOUNTER — HOSPITAL ENCOUNTER (OUTPATIENT)
Dept: RADIOLOGY | Facility: HOSPITAL | Age: 39
Discharge: HOME | End: 2024-02-19
Admitting: RADIOLOGY
Payer: COMMERCIAL

## 2024-02-19 ENCOUNTER — HOSPITAL ENCOUNTER (OUTPATIENT)
Dept: RADIOLOGY | Facility: HOSPITAL | Age: 39
Discharge: HOME | End: 2024-02-19
Payer: COMMERCIAL

## 2024-02-19 DIAGNOSIS — M25.512 PAIN IN LEFT SHOULDER: ICD-10-CM

## 2024-02-19 DIAGNOSIS — T15.90XA FOREIGN BODY ON EXTERNAL EYE, PART UNSPECIFIED, UNSPECIFIED EYE, INITIAL ENCOUNTER: ICD-10-CM

## 2024-02-19 PROCEDURE — G1004 CDSM NDSC: HCPCS

## 2024-02-19 PROCEDURE — 77002 NEEDLE LOCALIZATION BY XRAY: CPT

## 2024-02-19 PROCEDURE — 23350 INJECTION FOR SHOULDER X-RAY: CPT

## 2024-02-19 PROCEDURE — 73222 MRI JOINT UPR EXTREM W/DYE: CPT

## 2024-02-19 PROCEDURE — A9585 GADOBUTROL INJECTION: HCPCS | Performed by: ORTHOPAEDIC SURGERY

## 2024-02-19 PROCEDURE — 70200 X-RAY EXAM OF EYE SOCKETS: CPT

## 2024-02-19 RX ORDER — SODIUM CHLORIDE 9 MG/ML
15 INJECTION INTRAVENOUS ONCE
Status: COMPLETED | OUTPATIENT
Start: 2024-02-19 | End: 2024-02-19

## 2024-02-19 RX ORDER — LIDOCAINE HYDROCHLORIDE 10 MG/ML
5 INJECTION, SOLUTION EPIDURAL; INFILTRATION; INTRACAUDAL; PERINEURAL ONCE
Status: COMPLETED | OUTPATIENT
Start: 2024-02-19 | End: 2024-02-19

## 2024-02-19 RX ORDER — GADOBUTROL 604.72 MG/ML
0.2 INJECTION INTRAVENOUS
Status: COMPLETED | OUTPATIENT
Start: 2024-02-19 | End: 2024-02-19

## 2024-02-19 RX ADMIN — IOHEXOL 5 ML: 300 INJECTION, SOLUTION INTRAVENOUS at 10:47

## 2024-02-19 RX ADMIN — GADOBUTROL 0.2 ML: 604.72 INJECTION INTRAVENOUS at 11:59

## 2024-02-19 RX ADMIN — SODIUM CHLORIDE, PRESERVATIVE FREE 15 ML: 5 INJECTION INTRAVENOUS at 10:47

## 2024-02-19 RX ADMIN — LIDOCAINE HYDROCHLORIDE 5 ML: 10 INJECTION, SOLUTION EPIDURAL; INFILTRATION; INTRACAUDAL; PERINEURAL at 10:47

## 2024-06-14 ENCOUNTER — APPOINTMENT (OUTPATIENT)
Dept: LAB | Facility: MEDICAL CENTER | Age: 39
End: 2024-06-14
Payer: COMMERCIAL

## 2024-06-14 DIAGNOSIS — Z01.89 ENCOUNTER FOR OTHER SPECIFIED SPECIAL EXAMINATIONS: ICD-10-CM

## 2024-06-14 LAB
APTT PPP: 32 SECONDS (ref 23–37)
INR PPP: 1 (ref 0.84–1.19)
PROTHROMBIN TIME: 13.1 SECONDS (ref 11.6–14.5)

## 2024-06-14 PROCEDURE — 85610 PROTHROMBIN TIME: CPT

## 2024-06-14 PROCEDURE — 36415 COLL VENOUS BLD VENIPUNCTURE: CPT

## 2024-06-14 PROCEDURE — 85730 THROMBOPLASTIN TIME PARTIAL: CPT

## 2024-09-22 ENCOUNTER — OFFICE VISIT (OUTPATIENT)
Dept: URGENT CARE | Facility: MEDICAL CENTER | Age: 39
End: 2024-09-22
Payer: COMMERCIAL

## 2024-09-22 VITALS
RESPIRATION RATE: 18 BRPM | HEIGHT: 70 IN | OXYGEN SATURATION: 98 % | BODY MASS INDEX: 32.21 KG/M2 | TEMPERATURE: 98 F | HEART RATE: 90 BPM | WEIGHT: 225 LBS | DIASTOLIC BLOOD PRESSURE: 88 MMHG | SYSTOLIC BLOOD PRESSURE: 138 MMHG

## 2024-09-22 DIAGNOSIS — J04.0 ACUTE LARYNGITIS: Primary | ICD-10-CM

## 2024-09-22 LAB — S PYO AG THROAT QL: NEGATIVE

## 2024-09-22 PROCEDURE — 87070 CULTURE OTHR SPECIMN AEROBIC: CPT | Performed by: PHYSICIAN ASSISTANT

## 2024-09-22 PROCEDURE — 87880 STREP A ASSAY W/OPTIC: CPT | Performed by: PHYSICIAN ASSISTANT

## 2024-09-22 PROCEDURE — G0383 LEV 4 HOSP TYPE B ED VISIT: HCPCS | Performed by: PHYSICIAN ASSISTANT

## 2024-09-22 RX ORDER — IBUPROFEN 800 MG/1
TABLET, FILM COATED ORAL
COMMUNITY

## 2024-09-22 RX ORDER — MELOXICAM 15 MG/1
1 TABLET ORAL DAILY
COMMUNITY

## 2024-09-22 RX ORDER — CELECOXIB 200 MG/1
CAPSULE ORAL
COMMUNITY

## 2024-09-22 RX ORDER — METHYLPREDNISOLONE 4 MG
TABLET, DOSE PACK ORAL
Qty: 21 TABLET | Refills: 0 | Status: SHIPPED | OUTPATIENT
Start: 2024-09-22

## 2024-09-22 RX ORDER — HYDROCODONE BITARTRATE AND ACETAMINOPHEN 7.5; 325 MG/1; MG/1
1 TABLET ORAL EVERY 6 HOURS PRN
COMMUNITY
Start: 2024-07-02

## 2024-09-22 NOTE — LETTER
September 22, 2024     Patient: Ted Fritz   YOB: 1985   Date of Visit: 9/22/2024       To Whom it May Concern:    Ted Fritz was seen in my clinic on 9/22/2024. He may return to work on 9/25/2024 .    If you have any questions or concerns, please don't hesitate to call.         Sincerely,          Nishant Le PA-C        CC: No Recipients

## 2024-09-22 NOTE — PROGRESS NOTES
[facility]        NAME: Ted Fritz is a 38 y.o. male  : 1985    MRN: [id number]  DATE: 2024  TIME: 6:42 PM    Assessment and Plan   Acute laryngitis [J04.0]  1. Acute laryngitis  methylPREDNISolone 4 MG tablet therapy pack    POCT rapid strepA    Throat culture            Patient Instructions     Laryngitis  Medrol Dosepak as directed  Will follow-up on throat culture  Follow up with PCP in 3-5 days.  Proceed to  ER if symptoms worsen.    Chief Complaint     Chief Complaint   Patient presents with    Sore Throat     Sore throat, tightness, loss of voice         History of Present Illness       38-year-old male who presents complaining of sore throat and having lost his voice x 2 days.  Patient states he has taken over-the-counter medications with no relief.  Denies fevers, chills, chest pain, shortness of breath.    Sore Throat         Review of Systems   Review of Systems   HENT:  Positive for sore throat.          Current Medications       Current Outpatient Medications:     methylPREDNISolone 4 MG tablet therapy pack, Use as directed on package, Disp: 21 tablet, Rfl: 0    celecoxib (CeleBREX) 200 mg capsule, TAKE 1 CAPSULE BY MOUTH EVERY DAY AS DIRECTED FOR 30 DAYS (Patient not taking: Reported on 2024), Disp: , Rfl:     cyclobenzaprine (FLEXERIL) 5 mg tablet, Take 1 tablet (5 mg total) by mouth 3 (three) times a day as needed for muscle spasms (Patient not taking: Reported on 2021), Disp: 21 tablet, Rfl: 0    famotidine (PEPCID) 20 mg tablet, Take 1 tablet (20 mg total) by mouth 2 (two) times a day (Patient not taking: Reported on 2023), Disp: 30 tablet, Rfl: 0    gabapentin (Neurontin) 100 mg capsule, Take 1 capsule (100 mg total) by mouth 3 (three) times a day (Patient not taking: Reported on 2023), Disp: 60 capsule, Rfl: 0    HYDROcodone-acetaminophen (NORCO) 7.5-325 mg per tablet, Take 1 tablet by mouth every 6 (six) hours as needed (Patient not  "taking: Reported on 9/22/2024), Disp: , Rfl:     ibuprofen (MOTRIN) 800 mg tablet, TAKE 1 TABLET BY MOUTH EVERY 8 HOURS AS DIRECTED (Patient not taking: Reported on 9/22/2024), Disp: , Rfl:     meloxicam (MOBIC) 15 mg tablet, Take 1 tablet by mouth daily (Patient not taking: Reported on 9/22/2024), Disp: , Rfl:     naproxen (EC NAPROSYN) 500 MG EC tablet, Take 1 tablet (500 mg total) by mouth 2 (two) times a day with meals (Patient not taking: Reported on 9/22/2024), Disp: 20 tablet, Rfl: 0    ofloxacin (FLOXIN) 0.3 % otic solution, Administer 10 drops into the left ear 2 (two) times a day (Patient not taking: Reported on 12/21/2021), Disp: 10 mL, Rfl: 0    sucralfate (CARAFATE) 1 g/10 mL suspension, Take 10 mL (1 g total) by mouth 4 (four) times a day (Patient not taking: Reported on 9/2/2022), Disp: 414 mL, Rfl: 0    valACYclovir (VALTREX) 1,000 mg tablet, Take 1 tablet (1,000 mg total) by mouth 3 (three) times a day for 7 days, Disp: 21 tablet, Rfl: 0    Current Allergies     Allergies as of 09/22/2024 - Reviewed 09/22/2024   Allergen Reaction Noted    Meloxicam Rash 09/22/2024            The following portions of the patient's history were reviewed and updated as appropriate: allergies, current medications, past family history, past medical history, past social history, past surgical history and problem list.     Past Medical History:   Diagnosis Date    Ear problems     HL (hearing loss)        Past Surgical History:   Procedure Laterality Date    ADENOIDECTOMY      FL INJECTION LEFT SHOULDER (ARTHROGRAM)  2/19/2024    TONSILLECTOMY         Family History   Problem Relation Age of Onset    No Known Problems Mother     No Known Problems Father          Medications have been verified.        Objective   /88   Pulse 90   Temp 98 °F (36.7 °C) (Temporal)   Resp 18   Ht 5' 10\" (1.778 m)   Wt 102 kg (225 lb)   SpO2 98%   BMI 32.28 kg/m²        Physical Exam     Physical Exam  Constitutional:       " General: He is not in acute distress.     Appearance: He is well-developed. He is not diaphoretic.   HENT:      Head: Normocephalic and atraumatic.      Right Ear: Hearing, tympanic membrane, ear canal and external ear normal.      Left Ear: Hearing, tympanic membrane, ear canal and external ear normal.      Mouth/Throat:      Mouth: Mucous membranes are normal. Mucous membranes are moist. No oral lesions.      Pharynx: Oropharynx is clear. Uvula midline. Posterior oropharyngeal erythema present. No oropharyngeal exudate or posterior oropharyngeal edema.      Tonsils: No tonsillar abscesses.   Cardiovascular:      Rate and Rhythm: Normal rate and regular rhythm.   Pulmonary:      Effort: Pulmonary effort is normal. No respiratory distress.      Breath sounds: Normal breath sounds. No stridor. No wheezing, rhonchi or rales.   Chest:      Chest wall: No tenderness.

## 2024-09-22 NOTE — PATIENT INSTRUCTIONS
Laryngitis  Medrol Dosepak as directed  Will follow-up on throat culture  Follow up with PCP in 3-5 days.  Proceed to  ER if symptoms worsen.

## 2024-09-24 LAB — BACTERIA THROAT CULT: NORMAL

## 2025-06-25 ENCOUNTER — TELEPHONE (OUTPATIENT)
Dept: FAMILY MEDICINE CLINIC | Facility: CLINIC | Age: 40
End: 2025-06-25

## 2025-06-25 NOTE — TELEPHONE ENCOUNTER
Left message for patient. He has not been seen in over two years and needs to schedule a physical or let us know if he's seeing a new provider